# Patient Record
Sex: FEMALE | Race: WHITE | NOT HISPANIC OR LATINO | Employment: OTHER | URBAN - METROPOLITAN AREA
[De-identification: names, ages, dates, MRNs, and addresses within clinical notes are randomized per-mention and may not be internally consistent; named-entity substitution may affect disease eponyms.]

---

## 2021-11-19 ENCOUNTER — HOSPITAL ENCOUNTER (INPATIENT)
Facility: CLINIC | Age: 61
LOS: 1 days | Discharge: OTHER INSTITUTION | End: 2021-11-20
Attending: PHYSICIAN ASSISTANT | Admitting: INTERNAL MEDICINE
Payer: COMMERCIAL

## 2021-11-19 ENCOUNTER — APPOINTMENT (OUTPATIENT)
Dept: GENERAL RADIOLOGY | Facility: CLINIC | Age: 61
End: 2021-11-19
Attending: PHYSICIAN ASSISTANT
Payer: COMMERCIAL

## 2021-11-19 DIAGNOSIS — I21.4 NSTEMI (NON-ST ELEVATED MYOCARDIAL INFARCTION) (H): ICD-10-CM

## 2021-11-19 LAB
ANION GAP SERPL CALCULATED.3IONS-SCNC: 7 MMOL/L (ref 3–14)
BASOPHILS # BLD AUTO: 0.1 10E3/UL (ref 0–0.2)
BASOPHILS NFR BLD AUTO: 0 %
BUN SERPL-MCNC: 20 MG/DL (ref 7–30)
CALCIUM SERPL-MCNC: 9.2 MG/DL (ref 8.5–10.1)
CHLORIDE BLD-SCNC: 103 MMOL/L (ref 94–109)
CO2 SERPL-SCNC: 26 MMOL/L (ref 20–32)
CREAT SERPL-MCNC: 0.74 MG/DL (ref 0.52–1.04)
D DIMER PPP FEU-MCNC: 0.44 UG/ML FEU (ref 0–0.5)
EOSINOPHIL # BLD AUTO: 0.1 10E3/UL (ref 0–0.7)
EOSINOPHIL NFR BLD AUTO: 1 %
ERYTHROCYTE [DISTWIDTH] IN BLOOD BY AUTOMATED COUNT: 13.3 % (ref 10–15)
GFR SERPL CREATININE-BSD FRML MDRD: 88 ML/MIN/1.73M2
GLUCOSE BLD-MCNC: 100 MG/DL (ref 70–99)
HCT VFR BLD AUTO: 44.4 % (ref 35–47)
HGB BLD-MCNC: 14.1 G/DL (ref 11.7–15.7)
HOLD SPECIMEN: NORMAL
IMM GRANULOCYTES # BLD: 0.1 10E3/UL
IMM GRANULOCYTES NFR BLD: 1 %
LYMPHOCYTES # BLD AUTO: 2.3 10E3/UL (ref 0.8–5.3)
LYMPHOCYTES NFR BLD AUTO: 20 %
MCH RBC QN AUTO: 28.9 PG (ref 26.5–33)
MCHC RBC AUTO-ENTMCNC: 31.8 G/DL (ref 31.5–36.5)
MCV RBC AUTO: 91 FL (ref 78–100)
MONOCYTES # BLD AUTO: 0.6 10E3/UL (ref 0–1.3)
MONOCYTES NFR BLD AUTO: 5 %
NEUTROPHILS # BLD AUTO: 8.2 10E3/UL (ref 1.6–8.3)
NEUTROPHILS NFR BLD AUTO: 73 %
NRBC # BLD AUTO: 0 10E3/UL
NRBC BLD AUTO-RTO: 0 /100
PLATELET # BLD AUTO: 293 10E3/UL (ref 150–450)
POTASSIUM BLD-SCNC: 4 MMOL/L (ref 3.4–5.3)
RBC # BLD AUTO: 4.88 10E6/UL (ref 3.8–5.2)
SARS-COV-2 RNA RESP QL NAA+PROBE: NEGATIVE
SODIUM SERPL-SCNC: 136 MMOL/L (ref 133–144)
TROPONIN I SERPL-MCNC: 0.66 UG/L (ref 0–0.04)
WBC # BLD AUTO: 11.3 10E3/UL (ref 4–11)

## 2021-11-19 PROCEDURE — 99207 PR CDG-HISTORY COMP: MEETS EXP. PROB FOCUSED- DOWN CODED LACK OF PFSH: CPT | Performed by: PHYSICIAN ASSISTANT

## 2021-11-19 PROCEDURE — 36415 COLL VENOUS BLD VENIPUNCTURE: CPT | Performed by: PHYSICIAN ASSISTANT

## 2021-11-19 PROCEDURE — 250N000013 HC RX MED GY IP 250 OP 250 PS 637: Performed by: INTERNAL MEDICINE

## 2021-11-19 PROCEDURE — 84484 ASSAY OF TROPONIN QUANT: CPT | Mod: 91 | Performed by: PHYSICIAN ASSISTANT

## 2021-11-19 PROCEDURE — 250N000013 HC RX MED GY IP 250 OP 250 PS 637: Performed by: PHYSICIAN ASSISTANT

## 2021-11-19 PROCEDURE — 84484 ASSAY OF TROPONIN QUANT: CPT | Performed by: PHYSICIAN ASSISTANT

## 2021-11-19 PROCEDURE — 96376 TX/PRO/DX INJ SAME DRUG ADON: CPT

## 2021-11-19 PROCEDURE — 93005 ELECTROCARDIOGRAM TRACING: CPT

## 2021-11-19 PROCEDURE — 71046 X-RAY EXAM CHEST 2 VIEWS: CPT

## 2021-11-19 PROCEDURE — C9803 HOPD COVID-19 SPEC COLLECT: HCPCS

## 2021-11-19 PROCEDURE — 99285 EMERGENCY DEPT VISIT HI MDM: CPT | Mod: 25

## 2021-11-19 PROCEDURE — 120N000001 HC R&B MED SURG/OB

## 2021-11-19 PROCEDURE — 87635 SARS-COV-2 COVID-19 AMP PRB: CPT | Performed by: PHYSICIAN ASSISTANT

## 2021-11-19 PROCEDURE — 99221 1ST HOSP IP/OBS SF/LOW 40: CPT | Mod: AI | Performed by: PHYSICIAN ASSISTANT

## 2021-11-19 PROCEDURE — 93005 ELECTROCARDIOGRAM TRACING: CPT | Mod: 76

## 2021-11-19 PROCEDURE — 250N000011 HC RX IP 250 OP 636: Performed by: PHYSICIAN ASSISTANT

## 2021-11-19 PROCEDURE — 96365 THER/PROPH/DIAG IV INF INIT: CPT

## 2021-11-19 PROCEDURE — 80048 BASIC METABOLIC PNL TOTAL CA: CPT | Performed by: PHYSICIAN ASSISTANT

## 2021-11-19 PROCEDURE — 85025 COMPLETE CBC W/AUTO DIFF WBC: CPT | Performed by: PHYSICIAN ASSISTANT

## 2021-11-19 PROCEDURE — 85379 FIBRIN DEGRADATION QUANT: CPT | Performed by: PHYSICIAN ASSISTANT

## 2021-11-19 PROCEDURE — 96366 THER/PROPH/DIAG IV INF ADDON: CPT

## 2021-11-19 PROCEDURE — 258N000003 HC RX IP 258 OP 636: Performed by: PHYSICIAN ASSISTANT

## 2021-11-19 RX ORDER — LIDOCAINE 40 MG/G
CREAM TOPICAL
Status: DISCONTINUED | OUTPATIENT
Start: 2021-11-19 | End: 2021-11-20 | Stop reason: HOSPADM

## 2021-11-19 RX ORDER — NITROGLYCERIN 0.1MG/HR
1 PATCH, TRANSDERMAL 24 HOURS TRANSDERMAL EVERY EVENING
Status: DISCONTINUED | OUTPATIENT
Start: 2021-11-19 | End: 2021-11-20

## 2021-11-19 RX ORDER — MORPHINE SULFATE 4 MG/ML
1 INJECTION, SOLUTION INTRAMUSCULAR; INTRAVENOUS
Status: DISCONTINUED | OUTPATIENT
Start: 2021-11-19 | End: 2021-11-20 | Stop reason: HOSPADM

## 2021-11-19 RX ORDER — NALOXONE HYDROCHLORIDE 0.4 MG/ML
0.4 INJECTION, SOLUTION INTRAMUSCULAR; INTRAVENOUS; SUBCUTANEOUS
Status: DISCONTINUED | OUTPATIENT
Start: 2021-11-19 | End: 2021-11-20 | Stop reason: HOSPADM

## 2021-11-19 RX ORDER — ACETAMINOPHEN 325 MG/1
650 TABLET ORAL EVERY 6 HOURS PRN
Status: DISCONTINUED | OUTPATIENT
Start: 2021-11-19 | End: 2021-11-20 | Stop reason: HOSPADM

## 2021-11-19 RX ORDER — AMOXICILLIN 250 MG
2 CAPSULE ORAL 2 TIMES DAILY PRN
Status: DISCONTINUED | OUTPATIENT
Start: 2021-11-19 | End: 2021-11-20 | Stop reason: HOSPADM

## 2021-11-19 RX ORDER — ONDANSETRON 4 MG/1
4 TABLET, ORALLY DISINTEGRATING ORAL EVERY 6 HOURS PRN
Status: DISCONTINUED | OUTPATIENT
Start: 2021-11-19 | End: 2021-11-20 | Stop reason: HOSPADM

## 2021-11-19 RX ORDER — CETIRIZINE HYDROCHLORIDE 10 MG/1
10 TABLET ORAL DAILY
COMMUNITY

## 2021-11-19 RX ORDER — IBUPROFEN 200 MG
400 TABLET ORAL EVERY 6 HOURS PRN
Status: ON HOLD | COMMUNITY
End: 2021-11-21

## 2021-11-19 RX ORDER — NALOXONE HYDROCHLORIDE 0.4 MG/ML
0.2 INJECTION, SOLUTION INTRAMUSCULAR; INTRAVENOUS; SUBCUTANEOUS
Status: DISCONTINUED | OUTPATIENT
Start: 2021-11-19 | End: 2021-11-20 | Stop reason: HOSPADM

## 2021-11-19 RX ORDER — ATORVASTATIN CALCIUM 40 MG/1
40 TABLET, FILM COATED ORAL EVERY EVENING
Status: DISCONTINUED | OUTPATIENT
Start: 2021-11-19 | End: 2021-11-20 | Stop reason: HOSPADM

## 2021-11-19 RX ORDER — HEPARIN SODIUM 10000 [USP'U]/100ML
0-5000 INJECTION, SOLUTION INTRAVENOUS CONTINUOUS
Status: DISCONTINUED | OUTPATIENT
Start: 2021-11-19 | End: 2021-11-20 | Stop reason: HOSPADM

## 2021-11-19 RX ORDER — DULOXETIN HYDROCHLORIDE 20 MG/1
20 CAPSULE, DELAYED RELEASE ORAL 2 TIMES DAILY
COMMUNITY
End: 2022-10-31

## 2021-11-19 RX ORDER — NITROGLYCERIN 0.4 MG/1
0.4 TABLET SUBLINGUAL EVERY 5 MIN PRN
Status: DISCONTINUED | OUTPATIENT
Start: 2021-11-19 | End: 2021-11-20 | Stop reason: HOSPADM

## 2021-11-19 RX ORDER — ASPIRIN 81 MG/1
81 TABLET, CHEWABLE ORAL DAILY
Status: DISCONTINUED | OUTPATIENT
Start: 2021-11-20 | End: 2021-11-20 | Stop reason: HOSPADM

## 2021-11-19 RX ORDER — FAMOTIDINE 20 MG/1
20 TABLET, FILM COATED ORAL 2 TIMES DAILY
Status: DISCONTINUED | OUTPATIENT
Start: 2021-11-19 | End: 2021-11-20 | Stop reason: HOSPADM

## 2021-11-19 RX ORDER — AMOXICILLIN 250 MG
1 CAPSULE ORAL 2 TIMES DAILY PRN
Status: DISCONTINUED | OUTPATIENT
Start: 2021-11-19 | End: 2021-11-20 | Stop reason: HOSPADM

## 2021-11-19 RX ORDER — DULOXETIN HYDROCHLORIDE 20 MG/1
20 CAPSULE, DELAYED RELEASE ORAL 2 TIMES DAILY
Status: DISCONTINUED | OUTPATIENT
Start: 2021-11-19 | End: 2021-11-20 | Stop reason: HOSPADM

## 2021-11-19 RX ORDER — ASPIRIN 81 MG/1
324 TABLET, CHEWABLE ORAL ONCE
Status: COMPLETED | OUTPATIENT
Start: 2021-11-19 | End: 2021-11-19

## 2021-11-19 RX ORDER — ONDANSETRON 2 MG/ML
4 INJECTION INTRAMUSCULAR; INTRAVENOUS EVERY 6 HOURS PRN
Status: DISCONTINUED | OUTPATIENT
Start: 2021-11-19 | End: 2021-11-20 | Stop reason: HOSPADM

## 2021-11-19 RX ORDER — HEPARIN SODIUM 10000 [USP'U]/100ML
0-5000 INJECTION, SOLUTION INTRAVENOUS CONTINUOUS
Status: DISCONTINUED | OUTPATIENT
Start: 2021-11-19 | End: 2021-11-19

## 2021-11-19 RX ORDER — ACETAMINOPHEN 650 MG/1
650 SUPPOSITORY RECTAL EVERY 6 HOURS PRN
Status: DISCONTINUED | OUTPATIENT
Start: 2021-11-19 | End: 2021-11-20 | Stop reason: HOSPADM

## 2021-11-19 RX ORDER — NITROGLYCERIN 0.4 MG/1
0.4 TABLET SUBLINGUAL EVERY 5 MIN PRN
Status: COMPLETED | OUTPATIENT
Start: 2021-11-19 | End: 2021-11-19

## 2021-11-19 RX ORDER — NITROGLYCERIN 0.1MG/HR
1 PATCH, TRANSDERMAL 24 HOURS TRANSDERMAL DAILY
Status: DISCONTINUED | OUTPATIENT
Start: 2021-11-20 | End: 2021-11-19

## 2021-11-19 RX ORDER — LISINOPRIL 5 MG/1
5 TABLET ORAL DAILY
Status: DISCONTINUED | OUTPATIENT
Start: 2021-11-20 | End: 2021-11-20

## 2021-11-19 RX ORDER — LANOLIN ALCOHOL/MO/W.PET/CERES
1000 CREAM (GRAM) TOPICAL DAILY
COMMUNITY

## 2021-11-19 RX ADMIN — HEPARIN SODIUM 1100 UNITS/HR: 1000 INJECTION INTRAVENOUS; SUBCUTANEOUS at 19:26

## 2021-11-19 RX ADMIN — NITROGLYCERIN 0.4 MG: 0.4 TABLET SUBLINGUAL at 18:52

## 2021-11-19 RX ADMIN — NITROGLYCERIN 0.4 MG: 0.4 TABLET SUBLINGUAL at 18:42

## 2021-11-19 RX ADMIN — NITROGLYCERIN 0.4 MG: 0.4 TABLET SUBLINGUAL at 19:15

## 2021-11-19 RX ADMIN — DULOXETINE HYDROCHLORIDE 20 MG: 20 CAPSULE, DELAYED RELEASE ORAL at 22:51

## 2021-11-19 RX ADMIN — NITROGLYCERIN 1 PATCH: 0.1 PATCH TRANSDERMAL at 23:30

## 2021-11-19 RX ADMIN — FAMOTIDINE 20 MG: 20 TABLET ORAL at 22:51

## 2021-11-19 RX ADMIN — METOPROLOL TARTRATE 12.5 MG: 25 TABLET, FILM COATED ORAL at 22:51

## 2021-11-19 RX ADMIN — ASPIRIN 81 MG CHEWABLE TABLET 324 MG: 81 TABLET CHEWABLE at 18:48

## 2021-11-19 RX ADMIN — ATORVASTATIN CALCIUM 40 MG: 40 TABLET, FILM COATED ORAL at 22:51

## 2021-11-19 RX ADMIN — Medication 1 MG: at 23:06

## 2021-11-19 ASSESSMENT — MIFFLIN-ST. JEOR: SCORE: 1518.88

## 2021-11-19 ASSESSMENT — ACTIVITIES OF DAILY LIVING (ADL)
ADLS_ACUITY_SCORE: 12

## 2021-11-19 ASSESSMENT — ENCOUNTER SYMPTOMS
COUGH: 1
NUMBNESS: 1

## 2021-11-19 NOTE — ED TRIAGE NOTES
Left sided chest pain radiates to left arm and back. Patient states pain started at 1330 with nausea and dental pain. ABC intact alert and no distress.

## 2021-11-19 NOTE — ED PROVIDER NOTES
History     Chief Complaint:  Chest Pain and Arm Pain       HPI   Terese Gonzalez is a 61 year old female with a history of fibromyalgia who presents to the ED for evaluation of chest pain.  The patient reports onset of left-sided chest pain that began around 1330 today.  She notes that it has been constant since that time, radiating down her left arm and around her left lateral back.  She describes it feeling is similar to indigestion.  She said it has made worse when taking a deep breath.  She rates her pain as 5-7/10. she notes that she took 2 ibuprofen without any change in her symptoms.  She reports associated left-sided paresthesias as well as a mild cough. She states she has been experiencing paresthesias for quite some time and is working with [facility] with respect to this. She denies any fevers, chills, URI symptoms, dyspnea, leg pain or swelling, rash.    ROS:  Review of Systems   Respiratory: Positive for cough.    Cardiovascular: Positive for chest pain.   Neurological: Positive for numbness.   All other systems reviewed and are negative.     Allergies:  No Known Allergies     Medications:    Cymbalta    Past Medical History:    Diverticulitis  Gastric ulcer  Superficial clot     Past Surgical History:    C section  Cholecystectomy     Social History:  Here with .    PCP: Clinic, [facility]     Physical Exam     Patient Vitals for the past 24 hrs:   BP Temp Temp src Pulse Resp SpO2 Weight   11/19/21 1920 130/77 -- -- 70 14 97 % --   11/19/21 1900 (!) 134/98 -- -- 73 17 98 % --   11/19/21 1858 (!) 125/92 -- -- 79 16 97 % --   11/19/21 1850 130/89 -- -- 75 12 100 % --   11/19/21 1845 -- -- -- -- -- -- 90.9 kg (200 lb 6.4 oz)   11/19/21 1830 (!) 144/105 -- -- 80 18 100 % --   11/19/21 1730 134/86 -- -- 70 12 100 % --   11/19/21 1630 (!) 161/91 -- -- 92 14 100 % --   11/19/21 1615 (!) 164/101 97.5  F (36.4  C) Temporal 75 20 99 % --        Physical Exam  Constitutional: Pleasant. Cooperative.    Eyes: Pupils equally round and reactive  HENT: Head is normal in appearance. Oropharynx is normal with moist mucus membranes.  Cardiovascular: Regular rate and rhythm and without murmurs.  Respiratory: Normal respiratory effort, lungs are clear bilaterally.  GI: Abdomen is soft, non-tender, non-distended. No guarding, rebound, or rigidity.  Musculoskeletal: No asymmetry of the lower extremities, no tenderness to palpation.   Skin: Normal, without rash.  Neurologic: Cranial nerves grossly intact, normal cognition, no focal deficits. Alert and oriented x 3.   Psychiatric: Normal affect.  Nursing notes and vital signs reviewed.      Emergency Department Course   ECG:  ECG @ 1615  Vent Rate: 69 BPM   FL Interval: 144 ms   QRS duration: 82 ms   QT/QTc: 386/413 ms   P-R-T Axis: * 8 64  Findings: Normal sinus rhythm  Nonspecific T wave abnormality   III + aVL + aVF  Compared to previous 10/26/2014    ECG @ 1827  Vent Rate: 79 BPM   FL Interval: 140 ms   QRS duration: 82 ms   QT/QTc: 376/431 ms   P-R-T Axis: 28 -15 36  Findings: Normal sinus rhythm  Normal ECG  Improved from previous 11/19/21 @1615    Imaging:  XR Chest 2 Views   Final Result   IMPRESSION: Small esophageal hiatal hernia. Previous cholecystectomy. Mild scoliosis. No pulmonary infiltrates. Normal heart size.         Report per radiology    Laboratory:  Labs Ordered and Resulted from Time of ED Arrival to Time of ED Departure   TROPONIN I - Abnormal       Result Value    Troponin I 0.664 (*)    BASIC METABOLIC PANEL - Abnormal    Sodium 136      Potassium 4.0      Chloride 103      Carbon Dioxide (CO2) 26      Anion Gap 7      Urea Nitrogen 20      Creatinine 0.74      Calcium 9.2      Glucose 100 (*)     GFR Estimate 88     CBC WITH PLATELETS AND DIFFERENTIAL - Abnormal    WBC Count 11.3 (*)     RBC Count 4.88      Hemoglobin 14.1      Hematocrit 44.4      MCV 91      MCH 28.9      MCHC 31.8      RDW 13.3      Platelet Count 293      % Neutrophils 73      %  Lymphocytes 20      % Monocytes 5      % Eosinophils 1      % Basophils 0      % Immature Granulocytes 1      NRBCs per 100 WBC 0      Absolute Neutrophils 8.2      Absolute Lymphocytes 2.3      Absolute Monocytes 0.6      Absolute Eosinophils 0.1      Absolute Basophils 0.1      Absolute Immature Granulocytes 0.1 (*)     Absolute NRBCs 0.0     D DIMER QUANTITATIVE - Normal    D-Dimer Quantitative 0.44     COVID-19 VIRUS (CORONAVIRUS) BY PCR        Emergency Department Course:  Reviewed:  I reviewed nursing notes, vitals and past medical history    Assessments:   I obtained history and examined the patient as noted above.   1824 I rechecked the patient and explained findings.   1912 I rechecked the patient and explained findings.    Consults:   Spoke with hospitalist.    Interventions:  Medications   heparin infusion 25,000 units in D5W 250 mL ANTICOAGULANT (1,100 Units/hr Intravenous New Bag 11/19/21 1926)   nitroGLYcerin (NITROSTAT) sublingual tablet 0.4 mg (0.4 mg Sublingual Given 11/19/21 1915)   aspirin (ASA) chewable tablet 324 mg (324 mg Oral Given 11/19/21 1848)   heparin loading dose for LOW INTENSITY TREATMENT * Give BEFORE starting heparin infusion (5,450 Units Intravenous Given 11/19/21 1926)        Disposition:  The patient was admitted to the hospital.    Impression & Plan      Medical Decision Making:  Terese Gonzalez is a 61 year old female who presents to the ED for evaluation of chest pain.  See HPI as above for additional details.  Vitals and physical exam as above.  Differential is broad include ACS, dissection, PE, pneumothorax, GERD, MSK, pneumonia, pericarditis, among others.  Work-up obtained as above notable for elevated troponin at 0.664.  Remainder of work-up as above.  Patient initiated on heparin and provided aspirin here in the ED.  Discussed with patient plan for admission for continued monitoring and further work-up.  Patient in agreement for admission.  Discussed case with  hospitalist, agreed to admit the patient.  All questions answered.  Patient admitted in stable condition.    Diagnosis:    ICD-10-CM    1. NSTEMI (non-ST elevated myocardial infarction) (H)  I21.4         I, Kristin Oscar, am serving as a scribe at 6:24 PM on 11/19/2021 to document services personally performed by Caleb Dow PA-C, based on my observations and the provider's statements to me.    11/19/2021   Caleb Dow PA-C     This record was created at least in part using electronic voice recognition software, so please excuse any typographical errors.       Caleb Dow PA-C  11/19/21 2002

## 2021-11-20 ENCOUNTER — APPOINTMENT (OUTPATIENT)
Dept: CARDIOLOGY | Facility: CLINIC | Age: 61
End: 2021-11-20
Attending: PHYSICIAN ASSISTANT
Payer: COMMERCIAL

## 2021-11-20 ENCOUNTER — HOSPITAL ENCOUNTER (INPATIENT)
Facility: CLINIC | Age: 61
LOS: 2 days | Discharge: HOME | End: 2021-11-22
Attending: INTERNAL MEDICINE | Admitting: INTERNAL MEDICINE
Payer: COMMERCIAL

## 2021-11-20 VITALS
BODY MASS INDEX: 30.25 KG/M2 | HEIGHT: 68 IN | DIASTOLIC BLOOD PRESSURE: 66 MMHG | HEART RATE: 62 BPM | WEIGHT: 199.6 LBS | OXYGEN SATURATION: 95 % | RESPIRATION RATE: 16 BRPM | SYSTOLIC BLOOD PRESSURE: 103 MMHG | TEMPERATURE: 97.9 F

## 2021-11-20 DIAGNOSIS — I21.4 NSTEMI (NON-ST ELEVATED MYOCARDIAL INFARCTION) (H): Primary | ICD-10-CM

## 2021-11-20 LAB
ANION GAP SERPL CALCULATED.3IONS-SCNC: 5 MMOL/L (ref 3–14)
BASOPHILS # BLD AUTO: 0.1 10E3/UL (ref 0–0.2)
BASOPHILS NFR BLD AUTO: 1 %
BUN SERPL-MCNC: 14 MG/DL (ref 7–30)
CALCIUM SERPL-MCNC: 8.7 MG/DL (ref 8.5–10.1)
CHLORIDE BLD-SCNC: 107 MMOL/L (ref 94–109)
CHOLEST SERPL-MCNC: 216 MG/DL
CO2 SERPL-SCNC: 25 MMOL/L (ref 20–32)
CREAT SERPL-MCNC: 0.76 MG/DL (ref 0.52–1.04)
EOSINOPHIL # BLD AUTO: 0.1 10E3/UL (ref 0–0.7)
EOSINOPHIL NFR BLD AUTO: 2 %
ERYTHROCYTE [DISTWIDTH] IN BLOOD BY AUTOMATED COUNT: 13.2 % (ref 10–15)
ERYTHROCYTE [DISTWIDTH] IN BLOOD BY AUTOMATED COUNT: 13.4 % (ref 10–15)
GFR SERPL CREATININE-BSD FRML MDRD: 85 ML/MIN/1.73M2
GLUCOSE BLD-MCNC: 108 MG/DL (ref 70–99)
HCT VFR BLD AUTO: 40 % (ref 35–47)
HCT VFR BLD AUTO: 40.2 % (ref 35–47)
HDLC SERPL-MCNC: 49 MG/DL
HGB BLD-MCNC: 12.5 G/DL (ref 11.7–15.7)
HGB BLD-MCNC: 12.8 G/DL (ref 11.7–15.7)
IMM GRANULOCYTES # BLD: 0 10E3/UL
IMM GRANULOCYTES NFR BLD: 1 %
LDLC SERPL CALC-MCNC: 138 MG/DL
LVEF ECHO: NORMAL
LYMPHOCYTES # BLD AUTO: 2.8 10E3/UL (ref 0.8–5.3)
LYMPHOCYTES NFR BLD AUTO: 33 %
MCH RBC QN AUTO: 28.5 PG (ref 26.5–33)
MCH RBC QN AUTO: 28.8 PG (ref 26.5–33)
MCHC RBC AUTO-ENTMCNC: 31.1 G/DL (ref 31.5–36.5)
MCHC RBC AUTO-ENTMCNC: 32 G/DL (ref 31.5–36.5)
MCV RBC AUTO: 90 FL (ref 78–100)
MCV RBC AUTO: 92 FL (ref 78–100)
MONOCYTES # BLD AUTO: 0.7 10E3/UL (ref 0–1.3)
MONOCYTES NFR BLD AUTO: 8 %
NEUTROPHILS # BLD AUTO: 5 10E3/UL (ref 1.6–8.3)
NEUTROPHILS NFR BLD AUTO: 55 %
NONHDLC SERPL-MCNC: 167 MG/DL
NRBC # BLD AUTO: 0 10E3/UL
NRBC BLD AUTO-RTO: 0 /100
PLATELET # BLD AUTO: 266 10E3/UL (ref 150–450)
PLATELET # BLD AUTO: 288 10E3/UL (ref 150–450)
POTASSIUM BLD-SCNC: 4.2 MMOL/L (ref 3.4–5.3)
RBC # BLD AUTO: 4.38 10E6/UL (ref 3.8–5.2)
RBC # BLD AUTO: 4.45 10E6/UL (ref 3.8–5.2)
SODIUM SERPL-SCNC: 137 MMOL/L (ref 133–144)
TRIGL SERPL-MCNC: 147 MG/DL
TROPONIN I SERPL-MCNC: 16.84 UG/L (ref 0–0.04)
TROPONIN I SERPL-MCNC: 18.8 UG/L (ref 0–0.04)
TROPONIN I SERPL-MCNC: 20.39 UG/L (ref 0–0.04)
UFH PPP CHRO-ACNC: 0.24 IU/ML
UFH PPP CHRO-ACNC: 0.32 IU/ML
UFH PPP CHRO-ACNC: 0.47 IU/ML
WBC # BLD AUTO: 8.7 10E3/UL (ref 4–11)
WBC # BLD AUTO: 9.1 10E3/UL (ref 4–11)

## 2021-11-20 PROCEDURE — 93005 ELECTROCARDIOGRAM TRACING: CPT

## 2021-11-20 PROCEDURE — 214N000001 HC R&B CCU UMMC

## 2021-11-20 PROCEDURE — 4A023N7 MEASUREMENT OF CARDIAC SAMPLING AND PRESSURE, LEFT HEART, PERCUTANEOUS APPROACH: ICD-10-PCS | Performed by: INTERNAL MEDICINE

## 2021-11-20 PROCEDURE — 36415 COLL VENOUS BLD VENIPUNCTURE: CPT | Performed by: PHYSICIAN ASSISTANT

## 2021-11-20 PROCEDURE — 85027 COMPLETE CBC AUTOMATED: CPT | Performed by: STUDENT IN AN ORGANIZED HEALTH CARE EDUCATION/TRAINING PROGRAM

## 2021-11-20 PROCEDURE — 99221 1ST HOSP IP/OBS SF/LOW 40: CPT | Performed by: INTERNAL MEDICINE

## 2021-11-20 PROCEDURE — 85520 HEPARIN ASSAY: CPT | Performed by: STUDENT IN AN ORGANIZED HEALTH CARE EDUCATION/TRAINING PROGRAM

## 2021-11-20 PROCEDURE — 250N000013 HC RX MED GY IP 250 OP 250 PS 637: Performed by: PHYSICIAN ASSISTANT

## 2021-11-20 PROCEDURE — 80061 LIPID PANEL: CPT | Performed by: PHYSICIAN ASSISTANT

## 2021-11-20 PROCEDURE — 93306 TTE W/DOPPLER COMPLETE: CPT

## 2021-11-20 PROCEDURE — 85520 HEPARIN ASSAY: CPT | Performed by: PHYSICIAN ASSISTANT

## 2021-11-20 PROCEDURE — B2111ZZ FLUOROSCOPY OF MULTIPLE CORONARY ARTERIES USING LOW OSMOLAR CONTRAST: ICD-10-PCS | Performed by: INTERNAL MEDICINE

## 2021-11-20 PROCEDURE — 36415 COLL VENOUS BLD VENIPUNCTURE: CPT | Performed by: STUDENT IN AN ORGANIZED HEALTH CARE EDUCATION/TRAINING PROGRAM

## 2021-11-20 PROCEDURE — 93306 TTE W/DOPPLER COMPLETE: CPT | Mod: 26 | Performed by: INTERNAL MEDICINE

## 2021-11-20 PROCEDURE — 84484 ASSAY OF TROPONIN QUANT: CPT | Performed by: PHYSICIAN ASSISTANT

## 2021-11-20 PROCEDURE — 250N000013 HC RX MED GY IP 250 OP 250 PS 637: Performed by: STUDENT IN AN ORGANIZED HEALTH CARE EDUCATION/TRAINING PROGRAM

## 2021-11-20 PROCEDURE — 250N000011 HC RX IP 250 OP 636: Performed by: STUDENT IN AN ORGANIZED HEALTH CARE EDUCATION/TRAINING PROGRAM

## 2021-11-20 PROCEDURE — 80048 BASIC METABOLIC PNL TOTAL CA: CPT | Performed by: PHYSICIAN ASSISTANT

## 2021-11-20 PROCEDURE — 93010 ELECTROCARDIOGRAM REPORT: CPT | Performed by: INTERNAL MEDICINE

## 2021-11-20 PROCEDURE — 85025 COMPLETE CBC W/AUTO DIFF WBC: CPT | Performed by: PHYSICIAN ASSISTANT

## 2021-11-20 PROCEDURE — 250N000011 HC RX IP 250 OP 636: Performed by: PHYSICIAN ASSISTANT

## 2021-11-20 PROCEDURE — 258N000003 HC RX IP 258 OP 636: Performed by: PHYSICIAN ASSISTANT

## 2021-11-20 PROCEDURE — 99239 HOSP IP/OBS DSCHRG MGMT >30: CPT | Performed by: STUDENT IN AN ORGANIZED HEALTH CARE EDUCATION/TRAINING PROGRAM

## 2021-11-20 RX ORDER — HEPARIN SODIUM 10000 [USP'U]/100ML
0-5000 INJECTION, SOLUTION INTRAVENOUS CONTINUOUS
Status: DISCONTINUED | OUTPATIENT
Start: 2021-11-20 | End: 2021-11-21

## 2021-11-20 RX ORDER — CETIRIZINE HYDROCHLORIDE 10 MG/1
10 TABLET ORAL DAILY
Status: DISCONTINUED | OUTPATIENT
Start: 2021-11-21 | End: 2021-11-22 | Stop reason: HOSPADM

## 2021-11-20 RX ORDER — ATORVASTATIN CALCIUM 40 MG/1
40 TABLET, FILM COATED ORAL EVERY EVENING
Status: DISCONTINUED | OUTPATIENT
Start: 2021-11-20 | End: 2021-11-22 | Stop reason: HOSPADM

## 2021-11-20 RX ORDER — DULOXETIN HYDROCHLORIDE 20 MG/1
20 CAPSULE, DELAYED RELEASE ORAL 2 TIMES DAILY
Status: DISCONTINUED | OUTPATIENT
Start: 2021-11-20 | End: 2021-11-22 | Stop reason: HOSPADM

## 2021-11-20 RX ORDER — LANOLIN ALCOHOL/MO/W.PET/CERES
1000 CREAM (GRAM) TOPICAL DAILY
Status: DISCONTINUED | OUTPATIENT
Start: 2021-11-21 | End: 2021-11-22 | Stop reason: HOSPADM

## 2021-11-20 RX ORDER — LIDOCAINE 40 MG/G
CREAM TOPICAL
Status: DISCONTINUED | OUTPATIENT
Start: 2021-11-20 | End: 2021-11-22 | Stop reason: HOSPADM

## 2021-11-20 RX ADMIN — HEPARIN SODIUM 1250 UNITS/HR: 1000 INJECTION INTRAVENOUS; SUBCUTANEOUS at 12:01

## 2021-11-20 RX ADMIN — MORPHINE SULFATE 1 MG: 4 INJECTION INTRAVENOUS at 03:52

## 2021-11-20 RX ADMIN — ASPIRIN 81 MG CHEWABLE TABLET 81 MG: 81 TABLET CHEWABLE at 09:12

## 2021-11-20 RX ADMIN — FAMOTIDINE 20 MG: 20 TABLET ORAL at 09:12

## 2021-11-20 RX ADMIN — ATORVASTATIN CALCIUM 40 MG: 40 TABLET, FILM COATED ORAL at 22:11

## 2021-11-20 RX ADMIN — LISINOPRIL 5 MG: 5 TABLET ORAL at 09:13

## 2021-11-20 RX ADMIN — DULOXETINE HYDROCHLORIDE 20 MG: 20 CAPSULE, DELAYED RELEASE ORAL at 09:12

## 2021-11-20 RX ADMIN — DULOXETINE HYDROCHLORIDE 20 MG: 20 CAPSULE, DELAYED RELEASE ORAL at 22:11

## 2021-11-20 RX ADMIN — MORPHINE SULFATE 1 MG: 4 INJECTION INTRAVENOUS at 00:57

## 2021-11-20 RX ADMIN — MORPHINE SULFATE 1 MG: 4 INJECTION INTRAVENOUS at 09:08

## 2021-11-20 RX ADMIN — NITROGLYCERIN 10 MCG/MIN: 20 INJECTION INTRAVENOUS at 12:53

## 2021-11-20 RX ADMIN — METOPROLOL TARTRATE 12.5 MG: 25 TABLET, FILM COATED ORAL at 09:13

## 2021-11-20 ASSESSMENT — ACTIVITIES OF DAILY LIVING (ADL)
ADLS_ACUITY_SCORE: 4
ADLS_ACUITY_SCORE: 6
ADLS_ACUITY_SCORE: 4
ADLS_ACUITY_SCORE: 6
ADLS_ACUITY_SCORE: 4

## 2021-11-20 ASSESSMENT — MIFFLIN-ST. JEOR: SCORE: 1496.2

## 2021-11-20 NOTE — DISCHARGE SUMMARY
[facility]  Discharge Summary  Name: Terese Gonzalez    MRN: [id number]  YOB: 1960    Age: 61 year old  Date of Discharge:  11/20/2021  Date of Admission: 11/19/2021  Primary Care Provider: System, Provider Not In  Discharge Physician:  Nav Latif DO  Discharging Service:  Hospitalist      Hospital Course  Terese Gonzalez is a 61 year old relatively healthy female except for history of fibromyalgia who presents to the ED on 11/19/21 for evaluation of left sided chest pain.      ED workup reveals: intermittent hypertensive otherwise VSS, BMP unremarkable, initial troponin of 0.664, glucose of 100, WBC of 11.3 otherwise CBC WNL, D-dimer of 0.44, UA shows rate of 69 bpm in NSR with nonspecific T wave abnormality in III, AVL, AVF at 16:15 with repeat showing NSR, and chest x-ray shows small esophageal hiatal hernia, no pulmonary infiltrates, and normal heart size.    The patient was initiated on a heparin drip and started on a nitroglycerin patch in the emergency department for suspected NSTEMI.  Troponin ultimately trended up to 20.0 in the setting of ongoing chest pain.  The patient was therefore started on a nitroglycerin drip.  The nitroglycerin drip improved her chest pain quite significantly.      Given need for nitroglycerin drip, the patient will be transferred to [facility] for more urgent catheterization versus ICU level cares (currently on intermediate cares at [facility] given lack of ICU beds).    Discharge Diagnoses:  NSTEMI:   Acute onset of left sided chest pain described as indigestion with radiation into left arm and lower left shoulder blade around 13:30 on the day of admission with associated nausea and pain in right lower jaw/tooth. Pain seemed worse with deep inhalation. No prior episodes similar to this. No h/o CAD, HTN, HLD, DM, or tobacco abuse. Family history of CAD in father and grandfather. EKG without overt ischemic  changes. Initial troponin elevated at 0.664 which then subsequently trended up to 16 and then 20.  TTE was completed which showed normal ejection fraction but did show evidence of distal posterolateral hypokinesis.  Given ongoing chest tightness/pressure, the patient was initiated on a nitroglycerin drip.  Plan is for transfer to [facility] for more urgent catheterization (if appropriate) versus ICU level cares as she is currently intermediate care (no ICU bed) at [facility].  - monitor on telemetry  - daily ASA  - I am holding the previously started metoprolol and lisinopril so that we can further uptitrate her nitroglycerin drip if needed  - Atorvastatin 40 mg daily   - Stop checking troponin levels it is now downtrending  - continue IV Heparin gtt (started in ED)  - Nitroglycerin drip titrated as needed to no chest pain  - PRN IV morphine for ongoing chest pain with patch in place   - cardiology consult for recommendations      Fibromyalgia: resume PTA Cymbalta     Discharge Disposition:  Transferred to [facility].  The patient was signed out to the accepting attending at [facility].    Allergies:  No Known Allergies     Discharge Medications:        Review of your medicines      UNREVIEWED medicines. Ask your doctor about these medicines      Dose / Directions   acetaminophen 650 MG CR tablet  Commonly known as: Tylenol 8 Hour  Used for: Concussion      Dose: 650 mg  Take 1 tablet by mouth every 8 hours as needed for pain.  Quantity: 100 tablet  Refills: 11     cetirizine 10 MG tablet  Commonly known as: zyrTEC      Dose: 10 mg  Take 10 mg by mouth daily  Refills: 0     cyanocobalamin 1000 MCG tablet  Commonly known as: VITAMIN B-12      Dose: 1,000 mcg  Take 1,000 mcg by mouth daily  Refills: 0     DULoxetine 20 MG capsule  Commonly known as: CYMBALTA      Dose: 20 mg  Take 20 mg by mouth 2 times daily  Refills: 0     ibuprofen 200 MG tablet  Commonly known as:  "ADVIL/MOTRIN  Ask about: Which instructions should I use?      Dose: 400 mg  Take 400 mg by mouth every 6 hours as needed for mild pain  Refills: 0            Condition on Discharge:  Discharge condition: Stable       Code status on discharge: Full Code     History of Illness:  See detailed admission note for full details.    Physical Exam:  Vital signs:  Temp: 97.9  F (36.6  C) Temp src: Oral BP: 95/63 Pulse: 63   Resp: 16 SpO2: 95 % O2 Device: None (Room air)   Height: 172.7 cm (5' 8\") Weight: 90.5 kg (199 lb 9.6 oz) (East bond scale)  Estimated body mass index is 30.35 kg/m  as calculated from the following:    Height as of this encounter: 1.727 m (5' 8\").    Weight as of this encounter: 90.5 kg (199 lb 9.6 oz).    Wt Readings from Last 1 Encounters:   11/19/21 90.5 kg (199 lb 9.6 oz)     General: Alert, awake, no acute distress.  HEENT: NC/AT, eyes anicteric, external occular movements intact, face symmetric.  Dentition WNL, MM moist.  Cardiac: RRR, S1, S2.  No murmurs appreciated.  Pulmonary: Normal chest rise, normal work of breathing.  Lungs CTA BL  Abdomen: soft, non-tender, non-distended.  Bowel Sounds Present.  No guarding.  Extremities: no deformities.  Warm, well perfused.  Skin: no rashes or lesions noted.  Warm and Dry.  Neuro: No focal deficits noted.  Speech clear.  Coordination and strength grossly normal.  Psych: Appropriate affect.    Procedures other than Imaging:  None     Imaging:  Recent Results (from the past 48 hour(s))   XR Chest 2 Views    Narrative    EXAM: XR CHEST 2 VW  LOCATION: [facility]  DATE/TIME: 11/19/2021 5:35 PM    INDICATION: Chest pain.  COMPARISON: None.      Impression    IMPRESSION: Small esophageal hiatal hernia. Previous cholecystectomy. Mild scoliosis. No pulmonary infiltrates. Normal heart size.   Echocardiogram Complete   Result Value    LVEF  55%    Narrative    [id number]  [id number]  [id number]  [id number]^LAURA^BERNARDO^MARSHA     [facility] " [facility]  Echocardiography Laboratory  [address] [place] [address]     Name: LONG BAH  MRN: [id number]  : 1960  Study Date: 2021 07:20 AM  Age: 61 yrs  Gender: Female  Patient Location: [facility]  Reason For Study: Chest Pain, Chest Tightness, Chest Pressure  Ordering Physician: BERNARDO SANCHEZ  Performed By: Savannah Tejeda     BSA: 2.0 m2  Height: 68 in  Weight: 200 lb  HR: 60  BP: 111/80 mmHg  ______________________________________________________________________________  Procedure  Complete Portable Echo Adult.  ______________________________________________________________________________  Interpretation Summary     The visual ejection fraction is estimated at 55%.  distal posterolateral hypokinesis  The right ventricle is normal in structure, function and size.  There is trace to mild tricuspid regurgitation.  The patient exhibited occasional PVCs.  There is no comparison study available.  ______________________________________________________________________________  Left Ventricle  The left ventricle is normal in structure, function and size. There is normal  left ventricular wall thickness. The visual ejection fraction is estimated at  55%. Left ventricular diastolic function is normal. distal posterolateral  hypokinesis.     Right Ventricle  The right ventricle is normal in structure, function and size.     Atria  Normal left atrial size. Right atrial size is normal. There is no color  Doppler evidence of an atrial shunt.     Mitral Valve  The mitral valve leaflets are mildly thickened. There is no mitral  regurgitation noted.     Tricuspid Valve  The tricuspid valve is normal in structure and function. There is trace to  mild tricuspid regurgitation. The right ventricular systolic pressure is  approximated at 18.9 mmHg plus the right atrial pressure.     Aortic Valve  The aortic valve is normal in structure and function.     Pulmonic Valve  The pulmonic valve is not  well visualized.     Vessels  Normal size aorta.     Pericardium  There is no pericardial effusion.     Rhythm  The patient exhibited occasional PVCs.  ______________________________________________________________________________  MMode/2D Measurements & Calculations     IVSd: 1.0 cm  LVIDd: 5.4 cm  LVIDs: 3.3 cm  LVPWd: 0.67 cm  FS: 39.4 %  LV mass(C)d: 164.9 grams  LV mass(C)dI: 80.7 grams/m2  Ao root diam: 3.1 cm  LA dimension: 3.8 cm  asc Aorta Diam: 3.4 cm  LA/Ao: 1.2  LVOT diam: 2.0 cm  LVOT area: 3.1 cm2  LA Volume (BP): 49.2 ml  LA Volume Index (BP): 24.1 ml/m2  RWT: 0.25     Doppler Measurements & Calculations  MV E max tommy: 65.6 cm/sec  MV A max tommy: 64.8 cm/sec  MV E/A: 1.0  MV dec time: 0.16 sec  LV V1 max P.3 mmHg  LV V1 max: 115.0 cm/sec  LV V1 VTI: 25.1 cm  SV(LVOT): 78.9 ml  SI(LVOT): 38.6 ml/m2  PA acc time: 0.13 sec  TR max tommy: 215.3 cm/sec  TR max P.9 mmHg  E/E' av.0  Lateral E/e': 8.9  Medial E/e': 7.2     ______________________________________________________________________________  Report approved by: Rach Leong 2021 10:49 AM                Consultations:  Consultation during this admission received from cardiology.       Recent Lab Results:  Recent Labs   Lab 21  0617 21  1634   WBC 8.7 11.3*   HGB 12.5 14.1   HCT 40.2 44.4   MCV 92 91    293          Lab Results   Component Value Date     2021     2021     10/26/2014     08/10/2012    Lab Results   Component Value Date    CHLORIDE 107 2021    CHLORIDE 103 2021    CHLORIDE 108 10/26/2014    CHLORIDE 107 08/10/2012    Lab Results   Component Value Date    BUN 14 2021    BUN 20 2021    BUN 18 10/26/2014    BUN 21 08/10/2012      Lab Results   Component Value Date    POTASSIUM 4.2 2021    POTASSIUM 4.0 2021    POTASSIUM 4.0 10/26/2014    POTASSIUM 3.9 08/10/2012    Lab Results   Component Value Date    CO2 25 2021     CO2 26 11/19/2021    CO2 26 10/26/2014    CO2 22 08/10/2012    Lab Results   Component Value Date    CR 0.76 11/20/2021    CR 0.74 11/19/2021    CR 0.88 10/26/2014    CR 0.99 08/10/2012             Pending Results:    Unresulted Labs Ordered in the Past 30 Days of this Admission     No orders found for last 31 day(s).           Discharge Instructions and Follow-Up:   No discharge procedures on file.    Total time spent in face to face contact with the patient and coordinating discharge was:  >30 Minutes.      Nav Latif DO

## 2021-11-20 NOTE — CONSULTS
[facility]    Cardiology Consultation     Date of Admission:  11/19/2021    Assessment & Plan      This is a pleasant 61 year old female with PMH significant for fibromyalgia who presents to the ER for evaluation of left sided chest pain. She reports no prior chest pain until 1:30 PM yesterday when she got abrupt onset of pressure that radiated to jaw/teeth and arm. She came to the ER where EKG demonstrated no ST elevations. Initial TN 0.66 but subsequent TN 5 hours later 20 and downtrending to 16. She had TW abnormalities inferolaterally and echocardiogram showed an apical lateral/inferior hypokinesis but preserved LV function, no akinesis or scar formation. Repeat EKG today at time of presentation again without ST elevations. She received SL nitroglycerin with some relief, along with morphine IV. She reports 1/10 arm pain still present at time of interview. Delayed nitroglycerin infusion because no ICU beds at [facility] and unable initially to start on the floor. She was started on heparin, atorvastatin, metoprolol, morphine and lisinopril.     Plan:     1. Heparin infusion   2. Aspirin loaded, daily 81 mg aspirin   3. Soft blood pressures but will attempt low dose nitroglycerin infusion (special exception on the regular telemetry floor given lack of transfer bed options) - if unable to get completely chest/arm/jaw pain free will d/w interventional for earlier cath otherwise can wait until Monday if chest pain resolves with nitroglycerin  4. Echocardiogram reviewed  5. BP control with ACEI and beta blocker - can downtitrate to allow room for nitroglycerin infusion   6. Started atorvastatin for HLD     Encouraged patient to call RN anytime ongoing pain. We discussed the unfortunate bed placement situation occurring now as a result of the pandemic and will accommodate her if her clinical situation changes and she 1) becomes unstable 2) has refractory chest discomfort despite above medications 3)  EKG changes. Patient agreeable to plan and expressed understanding.     Pamela Knight MD  Staff Cardiologist     Code Status    Full Code    Reason for Consult   Chest pressure      Chief Complaint     Chest pressure     History of Present Illness     This is a pleasant 61 year old female with PMH significant for fibromyalgia who presents to the ER for evaluation of left sided chest pain. She reports no prior chest pain until 1:30 PM yesterday when she got abrupt onset of pressure that radiated to jaw/teeth and arm. She came to the ER where EKG demonstrated no ST elevations. Initial TN 0.66 but subsequent TN 5 hours later 20 and downtrending to 16. She had TW abnormalities inferolaterally and echocardiogram showed an apical lateral/inferior hypokinesis but preserved LV function, no akinesis or scar formation. Repeat EKG today at time of presentation again without ST elevations. She received SL nitroglycerin with some relief, along with morphine IV. She reports 1/10 arm pain still present at time of interview. Delay in nitroglycerin infusion because no ICU beds at [facility]. She was started on heparin , atorvastatin, metoprolol and lisinopril.     She reports non smoking history but has remarkable family history for CAD. No COVID illness recently, is vaccinated.     Past Medical History   I have reviewed this patient's medical history and updated it with pertinent information if needed.   No past medical history on file.    Past Surgical History   I have reviewed this patient's surgical history and updated it with pertinent information if needed.  No past surgical history on file.    Prior to Admission Medications   Prior to Admission Medications   Prescriptions Last Dose Informant Patient Reported? Taking?   DULoxetine (CYMBALTA) 20 MG capsule 11/19/2021 at x1  Yes Yes   Sig: Take 20 mg by mouth 2 times daily   acetaminophen (TYLENOL 8 HOUR) 650 MG CR tablet   No Yes   Sig: Take 1 tablet by mouth every 8 hours as  needed for pain.   cetirizine (ZYRTEC) 10 MG tablet 11/19/2021 at Unknown time  Yes Yes   Sig: Take 10 mg by mouth daily   cyanocobalamin (VITAMIN B-12) 1000 MCG tablet 11/19/2021 at Unknown time  Yes Yes   Sig: Take 1,000 mcg by mouth daily   ibuprofen (ADVIL/MOTRIN) 200 MG tablet 11/19/2021 at 1530  Yes Yes   Sig: Take 400 mg by mouth every 6 hours as needed for mild pain      Facility-Administered Medications: None     Allergies   No Known Allergies    Social History   I have reviewed this patient's social history and updated it with pertinent information if needed. Terese Gonzalez      Family History   I have reviewed this patient's family history and updated it with pertinent information if needed.   No family history on file.    Review of Systems   The 10 point Review of Systems is negative other than noted in the HPI or here.     Physical Exam   Temp: 97.9  F (36.6  C) Temp src: Oral BP: 95/63 Pulse: 63   Resp: 16 SpO2: 95 % O2 Device: None (Room air)    Vital Signs with Ranges  Temp:  [96  F (35.6  C)-97.9  F (36.6  C)] 97.9  F (36.6  C)  Pulse:  [57-92] 63  Resp:  [11-20] 16  BP: ()/() 95/63  SpO2:  [95 %-100 %] 95 %  199 lbs 9.6 oz        Data   Results for orders placed or performed during the hospital encounter of 11/19/21 (from the past 24 hour(s))   EKG 12 lead   Result Value Ref Range    Systolic Blood Pressure  mmHg    Diastolic Blood Pressure  mmHg    Ventricular Rate 69 BPM    Atrial Rate 69 BPM    SD Interval 144 ms    QRS Duration 82 ms     ms    QTc 413 ms    P Axis  degrees    R AXIS 8 degrees    T Axis 64 degrees    Interpretation ECG       Sinus rhythm  Normal ECG  When compared with ECG of 26-OCT-2014 17:00,  ST elevation now present in Inferior leads     CBC with platelets differential    Narrative    The following orders were created for panel order CBC with platelets differential.  Procedure                               Abnormality         Status                      ---------                               -----------         ------                     CBC with platelets and d...[[id number]]  Abnormal            Final result                 Please view results for these tests on the individual orders.   Troponin I   Result Value Ref Range    Troponin I 0.664 (HH) 0.000 - 0.045 ug/L   Basic metabolic panel   Result Value Ref Range    Sodium 136 133 - 144 mmol/L    Potassium 4.0 3.4 - 5.3 mmol/L    Chloride 103 94 - 109 mmol/L    Carbon Dioxide (CO2) 26 20 - 32 mmol/L    Anion Gap 7 3 - 14 mmol/L    Urea Nitrogen 20 7 - 30 mg/dL    Creatinine 0.74 0.52 - 1.04 mg/dL    Calcium 9.2 8.5 - 10.1 mg/dL    Glucose 100 (H) 70 - 99 mg/dL    GFR Estimate 88 >60 mL/min/1.73m2   D dimer quantitative   Result Value Ref Range    D-Dimer Quantitative 0.44 0.00 - 0.50 ug/mL FEU    Narrative    This D-dimer assay is intended for use in conjunction with a clinical pretest probability assessment model to exclude pulmonary embolism (PE) and deep venous thrombosis (DVT) in outpatients suspected of PE or DVT. The cut-off value is 0.50 ug/mL FEU.   [place] Draw    Narrative    The following orders were created for panel order [place] Draw.  Procedure                               Abnormality         Status                     ---------                               -----------         ------                     Extra Red Top Tube[[id number]]                               Final result                 Please view results for these tests on the individual orders.   CBC with platelets and differential   Result Value Ref Range    WBC Count 11.3 (H) 4.0 - 11.0 10e3/uL    RBC Count 4.88 3.80 - 5.20 10e6/uL    Hemoglobin 14.1 11.7 - 15.7 g/dL    Hematocrit 44.4 35.0 - 47.0 %    MCV 91 78 - 100 fL    MCH 28.9 26.5 - 33.0 pg    MCHC 31.8 31.5 - 36.5 g/dL    RDW 13.3 10.0 - 15.0 %    Platelet Count 293 150 - 450 10e3/uL    % Neutrophils 73 %    % Lymphocytes 20 %    % Monocytes 5 %    % Eosinophils 1 %    %  Basophils 0 %    % Immature Granulocytes 1 %    NRBCs per 100 WBC 0 <1 /100    Absolute Neutrophils 8.2 1.6 - 8.3 10e3/uL    Absolute Lymphocytes 2.3 0.8 - 5.3 10e3/uL    Absolute Monocytes 0.6 0.0 - 1.3 10e3/uL    Absolute Eosinophils 0.1 0.0 - 0.7 10e3/uL    Absolute Basophils 0.1 0.0 - 0.2 10e3/uL    Absolute Immature Granulocytes 0.1 (H) <=0.0 10e3/uL    Absolute NRBCs 0.0 10e3/uL   Extra Red Top Tube   Result Value Ref Range    Hold Specimen JIC    XR Chest 2 Views    Narrative    EXAM: XR CHEST 2 VW  LOCATION: [facility]  DATE/TIME: 11/19/2021 5:35 PM    INDICATION: Chest pain.  COMPARISON: None.      Impression    IMPRESSION: Small esophageal hiatal hernia. Previous cholecystectomy. Mild scoliosis. No pulmonary infiltrates. Normal heart size.   EKG 12-lead, tracing only   Result Value Ref Range    Systolic Blood Pressure  mmHg    Diastolic Blood Pressure  mmHg    Ventricular Rate 79 BPM    Atrial Rate 79 BPM    DE Interval 140 ms    QRS Duration 82 ms     ms    QTc 431 ms    P Axis 28 degrees    R AXIS -15 degrees    T Axis 36 degrees    Interpretation ECG Sinus rhythm  Normal ECG      Asymptomatic COVID-19 Virus (Coronavirus) by PCR Nasopharyngeal    Specimen: Nasopharyngeal; Swab   Result Value Ref Range    SARS CoV2 PCR Negative Negative    Narrative    Testing was performed using the [employer]  SARS-CoV-2 & Influenza A/B Assay on the [employer]  Ella  System.  This test should be ordered for the detection of SARS-COV-2 in individuals who meet SARS-CoV-2 clinical and/or epidemiological criteria. Test performance is unknown in asymptomatic patients.  This test is for in vitro diagnostic use under the FDA EUA for laboratories certified under CLIA to perform moderate and/or high complexity testing. This test has not been FDA cleared or approved.  A negative test does not rule out the presence of PCR inhibitors in the specimen or target RNA in concentration below the limit of detection for  the assay. The possibility of a false negative should be considered if the patient's recent exposure or clinical presentation suggests COVID-19.  [facility] are certified under the Clinical Laboratory Improvement Amendments of 1988 (CLIA-88) as qualified to perform moderate and/or high complexity laboratory testing.   Troponin I   Result Value Ref Range    Troponin I 16.841 (HH) 0.000 - 0.045 ug/L   Heparin Unfractionated Anti Xa Level   Result Value Ref Range    Anti Xa Unfractionated Heparin 0.32 For Reference Range, See Comment IU/mL    Narrative    Therapeutic Range: UFH: 0.25-0.50 IU/mL for low intensity dosing,  0.30-0.70 IU/mL for high intensity dosing DVT and PE.  This test is not validated for other direct factor X inhibitors (e.g. rivaroxaban, apixaban, edoxaban, betrixaban, fondaparinux) and should not be used for monitoring of other medications.   CBC with platelets differential    Narrative    The following orders were created for panel order CBC with platelets differential.  Procedure                               Abnormality         Status                     ---------                               -----------         ------                     CBC with platelets and d...[[id number]]  Abnormal            Final result                 Please view results for these tests on the individual orders.   Troponin I   Result Value Ref Range    Troponin I 20.386 (HH) 0.000 - 0.045 ug/L   Lipid panel reflex to direct LDL   Result Value Ref Range    Cholesterol 216 (H) <200 mg/dL    Triglycerides 147 <150 mg/dL    Direct Measure HDL 49 (L) >=50 mg/dL    LDL Cholesterol Calculated 138 (H) <=100 mg/dL    Non HDL Cholesterol 167 (H) <130 mg/dL    Narrative    Cholesterol  Desirable:  <200 mg/dL    Triglycerides  Normal:  Less than 150 mg/dL  Borderline High:  150-199 mg/dL  High:  200-499 mg/dL  Very High:  Greater than or equal to 500 mg/dL    Direct Measure HDL  Female:  Greater than or equal to  50 mg/dL   Male:  Greater than or equal to 40 mg/dL    LDL Cholesterol  Desirable:  <100mg/dL  Above Desirable:  100-129 mg/dL   Borderline High:  130-159 mg/dL   High:  160-189 mg/dL   Very High:  >= 190 mg/dL    Non HDL Cholesterol  Desirable:  130 mg/dL  Above Desirable:  130-159 mg/dL  Borderline High:  160-189 mg/dL  High:  190-219 mg/dL  Very High:  Greater than or equal to 220 mg/dL   Basic metabolic panel   Result Value Ref Range    Sodium 137 133 - 144 mmol/L    Potassium 4.2 3.4 - 5.3 mmol/L    Chloride 107 94 - 109 mmol/L    Carbon Dioxide (CO2) 25 20 - 32 mmol/L    Anion Gap 5 3 - 14 mmol/L    Urea Nitrogen 14 7 - 30 mg/dL    Creatinine 0.76 0.52 - 1.04 mg/dL    Calcium 8.7 8.5 - 10.1 mg/dL    Glucose 108 (H) 70 - 99 mg/dL    GFR Estimate 85 >60 mL/min/1.73m2   CBC with platelets and differential   Result Value Ref Range    WBC Count 8.7 4.0 - 11.0 10e3/uL    RBC Count 4.38 3.80 - 5.20 10e6/uL    Hemoglobin 12.5 11.7 - 15.7 g/dL    Hematocrit 40.2 35.0 - 47.0 %    MCV 92 78 - 100 fL    MCH 28.5 26.5 - 33.0 pg    MCHC 31.1 (L) 31.5 - 36.5 g/dL    RDW 13.2 10.0 - 15.0 %    Platelet Count 288 150 - 450 10e3/uL    % Neutrophils 55 %    % Lymphocytes 33 %    % Monocytes 8 %    % Eosinophils 2 %    % Basophils 1 %    % Immature Granulocytes 1 %    NRBCs per 100 WBC 0 <1 /100    Absolute Neutrophils 5.0 1.6 - 8.3 10e3/uL    Absolute Lymphocytes 2.8 0.8 - 5.3 10e3/uL    Absolute Monocytes 0.7 0.0 - 1.3 10e3/uL    Absolute Eosinophils 0.1 0.0 - 0.7 10e3/uL    Absolute Basophils 0.1 0.0 - 0.2 10e3/uL    Absolute Immature Granulocytes 0.0 <=0.0 10e3/uL    Absolute NRBCs 0.0 10e3/uL   Echocardiogram Complete   Result Value Ref Range    LVEF  55%     Narrative    [id number]  [id number]  [id number]  [id number]^LAURA^BERNARDO^MARSHA     [facility]  Echocardiography Laboratory  [address] [place] [address]     Name: LONG BAH GAUTAM  MRN: [id number]  : 1960  Study Date: 2021 07:20  AM  Age: 61 yrs  Gender: Female  Patient Location: [facility]  Reason For Study: Chest Pain, Chest Tightness, Chest Pressure  Ordering Physician: BERNARDO SANCHEZ  Performed By: Savannah Tejeda     BSA: 2.0 m2  Height: 68 in  Weight: 200 lb  HR: 60  BP: 111/80 mmHg  ______________________________________________________________________________  Procedure  Complete Portable Echo Adult.  ______________________________________________________________________________  Interpretation Summary     The visual ejection fraction is estimated at 55%.  distal posterolateral hypokinesis  The right ventricle is normal in structure, function and size.  There is trace to mild tricuspid regurgitation.  The patient exhibited occasional PVCs.  There is no comparison study available.  ______________________________________________________________________________  Left Ventricle  The left ventricle is normal in structure, function and size. There is normal  left ventricular wall thickness. The visual ejection fraction is estimated at  55%. Left ventricular diastolic function is normal. distal posterolateral  hypokinesis.     Right Ventricle  The right ventricle is normal in structure, function and size.     Atria  Normal left atrial size. Right atrial size is normal. There is no color  Doppler evidence of an atrial shunt.     Mitral Valve  The mitral valve leaflets are mildly thickened. There is no mitral  regurgitation noted.     Tricuspid Valve  The tricuspid valve is normal in structure and function. There is trace to  mild tricuspid regurgitation. The right ventricular systolic pressure is  approximated at 18.9 mmHg plus the right atrial pressure.     Aortic Valve  The aortic valve is normal in structure and function.     Pulmonic Valve  The pulmonic valve is not well visualized.     Vessels  Normal size aorta.     Pericardium  There is no pericardial effusion.     Rhythm  The patient exhibited occasional  PVCs.  ______________________________________________________________________________  MMode/2D Measurements & Calculations     IVSd: 1.0 cm  LVIDd: 5.4 cm  LVIDs: 3.3 cm  LVPWd: 0.67 cm  FS: 39.4 %  LV mass(C)d: 164.9 grams  LV mass(C)dI: 80.7 grams/m2  Ao root diam: 3.1 cm  LA dimension: 3.8 cm  asc Aorta Diam: 3.4 cm  LA/Ao: 1.2  LVOT diam: 2.0 cm  LVOT area: 3.1 cm2  LA Volume (BP): 49.2 ml  LA Volume Index (BP): 24.1 ml/m2  RWT: 0.25     Doppler Measurements & Calculations  MV E max tommy: 65.6 cm/sec  MV A max tommy: 64.8 cm/sec  MV E/A: 1.0  MV dec time: 0.16 sec  LV V1 max P.3 mmHg  LV V1 max: 115.0 cm/sec  LV V1 VTI: 25.1 cm  SV(LVOT): 78.9 ml  SI(LVOT): 38.6 ml/m2  PA acc time: 0.13 sec  TR max tommy: 215.3 cm/sec  TR max P.9 mmHg  E/E' av.0  Lateral E/e': 8.9  Medial E/e': 7.2     ______________________________________________________________________________  Report approved by: Rach Leong 2021 10:49 AM         Heparin Unfractionated Anti Xa Level   Result Value Ref Range    Anti Xa Unfractionated Heparin 0.24 For Reference Range, See Comment IU/mL    Narrative    Therapeutic Range: UFH: 0.25-0.50 IU/mL for low intensity dosing,  0.30-0.70 IU/mL for high intensity dosing DVT and PE.  This test is not validated for other direct factor X inhibitors (e.g. rivaroxaban, apixaban, edoxaban, betrixaban, fondaparinux) and should not be used for monitoring of other medications.   Troponin I   Result Value Ref Range    Troponin I 18.803 () 0.000 - 0.045 ug/L   EKG 12-lead, complete   Result Value Ref Range    Systolic Blood Pressure  mmHg    Diastolic Blood Pressure  mmHg    Ventricular Rate 60 BPM    Atrial Rate 60 BPM    NV Interval 138 ms    QRS Duration 82 ms     ms    QTc 424 ms    P Axis 52 degrees    R AXIS -9 degrees    T Axis 34 degrees    Interpretation ECG       Sinus rhythm  Normal ECG  When compared with ECG of 2021 18:27, (unconfirmed)  No significant change was  found

## 2021-11-20 NOTE — H&P
[facility]    Hospitalist History and Physical    Name: Terese Gonzalez    MRN: [id number]  YOB: 1960    Age: 61 year old  Date of Admission:  11/19/2021  Date of Service (when I saw the patient): 11/19/21    Assessment & Plan   Terese Gonzalez is a 61 year old relatively healthy female except for history of fibromyalgia who presents to the ED on 11/19/21 for evaluation of left sided chest pain.     ED workup reveals: intermittent hypertensive otherwise VSS, BMP unremarkable, initial troponin of 0.664, glucose of 100, WBC of 11.3 otherwise CBC WNL, D-dimer of 0.44, UA shows rate of 69 bpm in NSR with nonspecific T wave abnormality in III, AVL, AVF at 16:15 with repeat showing NSR, and chest x-ray shows small esophageal hiatal hernia, no pulmonary infiltrates, and normal heart size.    #NSTEMI: acute onset of left sided chest pain described as indigestion with radiation into left arm and lower left shoulder blade around 13:30 today with associated nausea and pain in right lower jaw/tooth. Pain seemed worse with deep inhalation. No prior episodes similar to this. No h/o CAD, HTN, HLD, DM, or tobacco abuse. Family history of CAD in father and grandfather. EKG without overt ischemic changes. Initial troponin elevated at 0.664. Symptoms with elevated troponin concerning for ACS, due to this the patient received  mg and 3 sublingual nitroglycerin in the ED with improvement but not complete resolution of chest pain in the ED. Patient was initiated on IV Heparin gtt in ED.   - monitor on telemetry  - daily ASA  - start low dose Metoprolol and Lisinopril with parameters  - Atorvastatin 40 mg daily   - check fasting lipid panel in AM  - serial troponin levels   - continue IV Heparin gtt (started in ED)  - trial nitroglycerin patch due to ongoing mild chest discomfort  - PRN IV morphine for ongoing chest pain with patch in place   - obtain echocardiogram  - cardiology consult for  recommendations     #Fibromyalgia: resume PTA Cymbalta     Clinically Significant Risk Factors Present on Admission                 COVID: tested negative on 11/19/21, patient is fully vaccinated against COVID  DVT Prophylaxis: Heparin gtt  Code Status: Full Code, discussed with patient   Disposition: Expected stay >2 midnights, will admit to inpatient     Primary Care Physician   Dr. Parada at [facility]    Chief Complaint   Chest pain    History obtained from discussion with ED provider, Caleb Dow PA-C, chart review, and interview with patient.     History of Present Illness   Terese Gonzalez is a 61 year old female who presents with left sided chest pain. The patient states that she was driving home around 13:30 when she had onset of left-sided chest pain with radiation into her left arm and her left lower shoulder blade. Denies radiation into left jaw or neck. She reports associated nausea without vomiting and right lower jaw/tooth pain. She describes her pain feeling similar to indigestion. Denies associated diaphoresis, dizziness, lightheadedness, palpitations, or shortness of breath. She did feel it was made worse by taking a deep inhalation. She states that her pain was up to a 7-8/10 initially. The patient states she tried laying down and took 2 Advil and waited approximately 15-20 minutes without significant improvement, thus came in for evaluation. Denies any previous episodes similar to this. Denies any recent heavy lifting or straining. The patient states she did have upper respiratory symptoms about 3 weeks ago and has an ongoing dry cough since then. Patient exercises regularly and last was walking with her horse earlier today and did not have any chest pain at that time. Denies h/o CAD, HTN, or DM. Family history of CAD in father and grandfather. The patient received 3 sublingual nitroglycerin which improved her pain from a 7 down to a 1/10 which is very comfortable for her at this time.      Past Medical History    Fibromyalgia    Past Surgical History   Reviewed with patient and noncontributory.     Prior to Admission Medications   Prior to Admission Medications   Prescriptions Last Dose Informant Patient Reported? Taking?   DULoxetine (CYMBALTA) 20 MG capsule 11/19/2021 at x1  Yes Yes   Sig: Take 20 mg by mouth 2 times daily   acetaminophen (TYLENOL 8 HOUR) 650 MG CR tablet   No Yes   Sig: Take 1 tablet by mouth every 8 hours as needed for pain.   cetirizine (ZYRTEC) 10 MG tablet 11/19/2021 at Unknown time  Yes Yes   Sig: Take 10 mg by mouth daily   cyanocobalamin (VITAMIN B-12) 1000 MCG tablet 11/19/2021 at Unknown time  Yes Yes   Sig: Take 1,000 mcg by mouth daily   ibuprofen (ADVIL/MOTRIN) 200 MG tablet 11/19/2021 at 1530  Yes Yes   Sig: Take 400 mg by mouth every 6 hours as needed for mild pain      Facility-Administered Medications: None     Allergies   No Known Allergies    Social History   Social History     Tobacco Use     Smoking status: Not on file     Smokeless tobacco: Not on file   Substance Use Topics     Alcohol use: Not on file     Social History     Social History Narrative     Not on file     Family History   Family history reviewed with patient and significant for father and grandfather with heart disease.    Review of Systems   A Comprehensive greater than 10 system review of systems was carried out.  Pertinent positives and negatives are noted above.  Otherwise negative for contributory information.    Physical Exam   Temp: 97.5  F (36.4  C) Temp src: Temporal BP: 130/77 Pulse: 70   Resp: 14 SpO2: 97 % O2 Device: None (Room air)    Vital Signs with Ranges  Temp:  [97.5  F (36.4  C)] 97.5  F (36.4  C)  Pulse:  [70-92] 70  Resp:  [12-20] 14  BP: (125-164)/() 130/77  SpO2:  [97 %-100 %] 97 %  200 lbs 6.37 oz    GEN:  Alert, oriented x 3, appears comfortable sitting up on gurney, no overt distress  HEENT:  Normocephalic/atraumatic, no scleral icterus, no nasal discharge,  mouth moist.  CV:  Regular rate and rhythm, no murmur or JVD.  S1 + S2 noted, no S3 or S4.  Chest: mildly reproducible pain over medial central aspect of left breast.  LUNGS:  Clear to auscultation bilaterally without rales/rhonchi/wheezing/retractions.  Symmetric chest rise on inhalation noted.  ABD:  Active bowel sounds, soft, non-tender/non-distended.  No rebound/guarding/rigidity.  EXT:  No edema.  No cyanosis.  No acute joint synovitis noted.  SKIN:  Dry to touch, no exanthems noted in the visualized areas.  NEURO:  Symmetric muscle strength, sensation to touch grossly intact.  Coordination symmetric on general exam.  No new focal deficits appreciated.    Data   Data reviewed today:  I personally reviewed the EKG tracing showing rate of 69 bpm in NSR with nonspecific T wave abnormality in III, AVL, AVF at 16:15 with repeat showing NSR.    Results for orders placed or performed during the hospital encounter of 11/19/21   XR Chest 2 Views     Status: None    Narrative    EXAM: XR CHEST 2 VW  LOCATION: [facility]  DATE/TIME: 11/19/2021 5:35 PM    INDICATION: Chest pain.  COMPARISON: None.      Impression    IMPRESSION: Small esophageal hiatal hernia. Previous cholecystectomy. Mild scoliosis. No pulmonary infiltrates. Normal heart size.   Troponin I     Status: Abnormal   Result Value Ref Range    Troponin I 0.664 (HH) 0.000 - 0.045 ug/L   Basic metabolic panel     Status: Abnormal   Result Value Ref Range    Sodium 136 133 - 144 mmol/L    Potassium 4.0 3.4 - 5.3 mmol/L    Chloride 103 94 - 109 mmol/L    Carbon Dioxide (CO2) 26 20 - 32 mmol/L    Anion Gap 7 3 - 14 mmol/L    Urea Nitrogen 20 7 - 30 mg/dL    Creatinine 0.74 0.52 - 1.04 mg/dL    Calcium 9.2 8.5 - 10.1 mg/dL    Glucose 100 (H) 70 - 99 mg/dL    GFR Estimate 88 >60 mL/min/1.73m2   D dimer quantitative     Status: Normal   Result Value Ref Range    D-Dimer Quantitative 0.44 0.00 - 0.50 ug/mL FEU    Narrative    This D-dimer assay is  intended for use in conjunction with a clinical pretest probability assessment model to exclude pulmonary embolism (PE) and deep venous thrombosis (DVT) in outpatients suspected of PE or DVT. The cut-off value is 0.50 ug/mL FEU.   CBC with platelets and differential     Status: Abnormal   Result Value Ref Range    WBC Count 11.3 (H) 4.0 - 11.0 10e3/uL    RBC Count 4.88 3.80 - 5.20 10e6/uL    Hemoglobin 14.1 11.7 - 15.7 g/dL    Hematocrit 44.4 35.0 - 47.0 %    MCV 91 78 - 100 fL    MCH 28.9 26.5 - 33.0 pg    MCHC 31.8 31.5 - 36.5 g/dL    RDW 13.3 10.0 - 15.0 %    Platelet Count 293 150 - 450 10e3/uL    % Neutrophils 73 %    % Lymphocytes 20 %    % Monocytes 5 %    % Eosinophils 1 %    % Basophils 0 %    % Immature Granulocytes 1 %    NRBCs per 100 WBC 0 <1 /100    Absolute Neutrophils 8.2 1.6 - 8.3 10e3/uL    Absolute Lymphocytes 2.3 0.8 - 5.3 10e3/uL    Absolute Monocytes 0.6 0.0 - 1.3 10e3/uL    Absolute Eosinophils 0.1 0.0 - 0.7 10e3/uL    Absolute Basophils 0.1 0.0 - 0.2 10e3/uL    Absolute Immature Granulocytes 0.1 (H) <=0.0 10e3/uL    Absolute NRBCs 0.0 10e3/uL   Extra Red Top Tube     Status: None   Result Value Ref Range    Hold Specimen [facility]    EKG 12 lead     Status: None (Preliminary result)   Result Value Ref Range    Systolic Blood Pressure  mmHg    Diastolic Blood Pressure  mmHg    Ventricular Rate 69 BPM    Atrial Rate 69 BPM    RI Interval 144 ms    QRS Duration 82 ms     ms    QTc 413 ms    P Axis  degrees    R AXIS 8 degrees    T Axis 64 degrees    Interpretation ECG       Sinus rhythm  Normal ECG  When compared with ECG of 26-OCT-2014 17:00,  ST elevation now present in Inferior leads     EKG 12-lead, tracing only     Status: None (Preliminary result)   Result Value Ref Range    Systolic Blood Pressure  mmHg    Diastolic Blood Pressure  mmHg    Ventricular Rate 79 BPM    Atrial Rate 79 BPM    RI Interval 140 ms    QRS Duration 82 ms     ms    QTc 431 ms    P Axis 28 degrees    R AXIS -15  degrees    T Axis 36 degrees    Interpretation ECG Sinus rhythm  Normal ECG      CBC with platelets differential     Status: Abnormal    Narrative    The following orders were created for panel order CBC with platelets differential.  Procedure                               Abnormality         Status                     ---------                               -----------         ------                     CBC with platelets and d...[[id number]]  Abnormal            Final result                 Please view results for these tests on the individual orders.   [place] Draw     Status: None    Narrative    The following orders were created for panel order [place] Draw.  Procedure                               Abnormality         Status                     ---------                               -----------         ------                     Extra Red Top Tube[[id number]]                               Final result                 Please view results for these tests on the individual orders.     Jessa Bell [facility]   I discussed the patient with Dr. Langford and he agrees with the above plan.

## 2021-11-20 NOTE — ED NOTES
[facility]  ED Nurse Handoff Report    Terese Gonzalez is a 61 year old female   ED Chief complaint: Chest Pain and Arm Pain  . ED Diagnosis:   Final diagnoses:   NSTEMI (non-ST elevated myocardial infarction) (H)     Allergies: No Known Allergies    Code Status: Full Code  Activity level - Baseline/Home:  Independent. Activity Level - Current:   Independent. Lift room needed: No. Bariatric: No   Needed: No   Isolation: No. Infection: Not Applicable.     Vital Signs:   Vitals:    11/19/21 1850 11/19/21 1858 11/19/21 1900 11/19/21 1920   BP: 130/89 (!) 125/92 (!) 134/98 130/77   Pulse: 75 79 73 70   Resp: 12 16 17 14   Temp:       TempSrc:       SpO2: 100% 97% 98% 97%   Weight:           Cardiac Rhythm:  ,      Pain level:    Patient confused: No. Patient Falls Risk: No.   Elimination Status: Has voided   Patient Report - Initial Complaint: Chest Pain. Focused Assessment:   Pt presents today with left sided chest pain around 1300. Pt states she has never had this pain before, does have a family history of CAD. Pt complained of 7/10 pain radiating into her left arm. Pt now with 3 doses of nitro and pain is now 3/10.. Pt is A&O x4, GCS-15.  Tests Performed:   Labs Ordered and Resulted from Time of ED Arrival to Time of ED Departure   TROPONIN I - Abnormal       Result Value    Troponin I 0.664 (*)    BASIC METABOLIC PANEL - Abnormal    Sodium 136      Potassium 4.0      Chloride 103      Carbon Dioxide (CO2) 26      Anion Gap 7      Urea Nitrogen 20      Creatinine 0.74      Calcium 9.2      Glucose 100 (*)     GFR Estimate 88     CBC WITH PLATELETS AND DIFFERENTIAL - Abnormal    WBC Count 11.3 (*)     RBC Count 4.88      Hemoglobin 14.1      Hematocrit 44.4      MCV 91      MCH 28.9      MCHC 31.8      RDW 13.3      Platelet Count 293      % Neutrophils 73      % Lymphocytes 20      % Monocytes 5      % Eosinophils 1      % Basophils 0      % Immature Granulocytes 1      NRBCs per 100 WBC 0       Absolute Neutrophils 8.2      Absolute Lymphocytes 2.3      Absolute Monocytes 0.6      Absolute Eosinophils 0.1      Absolute Basophils 0.1      Absolute Immature Granulocytes 0.1 (*)     Absolute NRBCs 0.0     D DIMER QUANTITATIVE - Normal    D-Dimer Quantitative 0.44     COVID-19 VIRUS (CORONAVIRUS) BY PCR     XR Chest 2 Views   Final Result   IMPRESSION: Small esophageal hiatal hernia. Previous cholecystectomy. Mild scoliosis. No pulmonary infiltrates. Normal heart size.        . Abnormal Results: Trop 0.66.   Treatments provided: SL nitroglycerin, IV Heparin  Family Comments:  at bedside.  OBS brochure/video discussed/provided to patient:  No  ED Medications:   Medications   heparin infusion 25,000 units in D5W 250 mL ANTICOAGULANT (1,100 Units/hr Intravenous New Bag 11/19/21 1926)   nitroGLYcerin (NITROSTAT) sublingual tablet 0.4 mg (0.4 mg Sublingual Given 11/19/21 1915)   aspirin (ASA) chewable tablet 324 mg (324 mg Oral Given 11/19/21 1848)   heparin loading dose for LOW INTENSITY TREATMENT * Give BEFORE starting heparin infusion (5,450 Units Intravenous Given 11/19/21 1926)     Drips infusing:  Yes, Heparin.  For the majority of the shift, the patient's behavior Green. Interventions performed were Call light within reach, pt using call light appropriately.    Sepsis treatment initiated: No     Patient tested for COVID 19 prior to admission: YES    ED Nurse Name/Phone Number: Keith Alfaro RN,   7:30 PM    RECEIVING UNIT ED HANDOFF REVIEW    Above ED Nurse Handoff Report was reviewed: Yes  Reviewed by: Yadira Rand RN on November 19, 2021 at 10:15 PM

## 2021-11-20 NOTE — PROGRESS NOTES
After nitroglycerin infusion, Pt is feeling much comfortable in regards to chest pain. Pt reports not quite pain free, but just feels tiny dot pain on left side. Hospitalist (Salomon) is aware and told not to titrate more on Nitroglycerin from current rate (40mvg/min). Pt VSS with MAP greater than 65. Will continue to monitor and assess.

## 2021-11-20 NOTE — ED PROVIDER NOTES
Emergency Department Attending Supervision Note  11/19/2021  6:35 PM      I evaluated this patient in conjunction with Caleb Dow PA-C      Briefly, the patient presented with sudden on set left CP @ 1330 radiating to left shoulder. No dyspnea.  She notes that she had been working with her horses prior to this but was not doing anything exertional.      On my exam,     Patient Vitals for the past 24 hrs:   BP Temp Temp src Pulse Resp SpO2 Weight   11/19/21 2200 111/80 -- -- 67 12 97 % --   11/19/21 2145 128/78 -- -- 67 19 97 % --   11/19/21 2130 137/82 -- -- 57 20 98 % --   11/19/21 2115 131/74 -- -- 60 18 98 % --   11/19/21 2100 131/86 -- -- 64 17 98 % --   11/19/21 2030 (!) 126/91 -- -- 66 11 100 % --   11/19/21 2000 121/77 -- -- 64 16 98 % --   11/19/21 1920 130/77 -- -- 70 14 97 % --   11/19/21 1900 (!) 134/98 -- -- 73 17 98 % --   11/19/21 1858 (!) 125/92 -- -- 79 16 97 % --   11/19/21 1850 130/89 -- -- 75 12 100 % --   11/19/21 1845 -- -- -- -- -- -- 90.9 kg (200 lb 6.4 oz)   11/19/21 1830 (!) 144/105 -- -- 80 18 100 % --   11/19/21 1730 134/86 -- -- 70 12 100 % --   11/19/21 1630 (!) 161/91 -- -- 92 14 100 % --   11/19/21 1615 (!) 164/101 97.5  F (36.4  C) Temporal 75 20 99 % --       Constitutional: Vital signs reviewed as above.   Head: No external signs of trauma noted.  Eyes: Pupils are equal, round, and reactive to light.   Neck: No JVD noted  Cardiovascular: Normal rate, regular rhythm and normal heart sounds.  No murmur heard. Equal B/L peripheral pulses.  Pulmonary/Chest: Effort normal and breath sounds normal. No respiratory distress. Patient has no wheezes. Patient has no rales.   Gastrointestinal: Soft. There is no tenderness.   Musculoskeletal/Extremities: No edema noted. Normal tone.  Neurological: Patient is alert and oriented to person, place, and time.   Skin: Skin is warm and dry. There is no diaphoresis noted.   Psychiatric: The patient appears calm.    Results:    XR Chest 2 Views   Final  Result   IMPRESSION: Small esophageal hiatal hernia. Previous cholecystectomy. Mild scoliosis. No pulmonary infiltrates. Normal heart size.          Labs Ordered and Resulted from Time of ED Arrival to Time of ED Departure   TROPONIN I - Abnormal       Result Value    Troponin I 0.664 (*)    BASIC METABOLIC PANEL - Abnormal    Sodium 136      Potassium 4.0      Chloride 103      Carbon Dioxide (CO2) 26      Anion Gap 7      Urea Nitrogen 20      Creatinine 0.74      Calcium 9.2      Glucose 100 (*)     GFR Estimate 88     CBC WITH PLATELETS AND DIFFERENTIAL - Abnormal    WBC Count 11.3 (*)     RBC Count 4.88      Hemoglobin 14.1      Hematocrit 44.4      MCV 91      MCH 28.9      MCHC 31.8      RDW 13.3      Platelet Count 293      % Neutrophils 73      % Lymphocytes 20      % Monocytes 5      % Eosinophils 1      % Basophils 0      % Immature Granulocytes 1      NRBCs per 100 WBC 0      Absolute Neutrophils 8.2      Absolute Lymphocytes 2.3      Absolute Monocytes 0.6      Absolute Eosinophils 0.1      Absolute Basophils 0.1      Absolute Immature Granulocytes 0.1 (*)     Absolute NRBCs 0.0     D DIMER QUANTITATIVE - Normal    D-Dimer Quantitative 0.44     COVID-19 VIRUS (CORONAVIRUS) BY PCR - Normal    SARS CoV2 PCR Negative         ED course:    Medications   heparin infusion 25,000 units in D5W 250 mL ANTICOAGULANT (1,100 Units/hr Intravenous New Bag 11/19/21 1926)   nitroGLYcerin (NITROSTAT) sublingual tablet 0.4 mg (0.4 mg Sublingual Given 11/19/21 1915)   aspirin (ASA) chewable tablet 324 mg (324 mg Oral Given 11/19/21 1848)   heparin loading dose for LOW INTENSITY TREATMENT * Give BEFORE starting heparin infusion (5,450 Units Intravenous Given 11/19/21 1926)       ED Course as of 11/19/21 2214 Fri Nov 19, 2021   1835 Dr. Palacios discussed the case with Caleb Dow PA-C. Discussed presentation, exam, ddx, plan.       1838 Dr. Palacios' evaluation.  Discussed results so far and anticipated plan of care with the  patient and her .     Case was discussed between Caleb Dow PA-C and the hospitalist.  She will be admitted on a heparin infusion for ongoing monitoring and care as well as additional work-up tomorrow.    Impression:    ICD-10-CM    1. NSTEMI (non-ST elevated myocardial infarction) (H)  I21.4          Caleb Dow PA-C Burns, Bradley Joseph,   11/19/21 2214

## 2021-11-20 NOTE — PROGRESS NOTES
Reported to Hansa ADAN 6C (Tele/Cardio) at the [facility]. Report given to Betty ADAN. Will continue to monitor and assess.

## 2021-11-20 NOTE — PHARMACY-ADMISSION MEDICATION HISTORY
Admission medication history interview status for this patient is complete. See [facility] admission navigator for allergy information, prior to admission medications and immunization status.     Medication history interview done, indicate source(s): Patient  Medication history resources (including written lists, pill bottles, clinic record):None  Pharmacy: -    Changes made to PTA medication list:  Added: zyrtec, vit b12, duloxetine  Changed: ibuprofen to 400 mg prn  Reported as Not Taking: -  Removed: -    Actions taken by pharmacist (provider contacted, etc):None     Additional medication history information:None    Medication reconciliation/reorder completed by provider prior to medication history?  no   (Y/N)     For patients on insulin therapy:   Do you use sliding scale insulin based on blood sugars?   What is your pre-meal insulin coverage?    Do you typically eat three meals a day?   How many times do you check your blood glucose per day?   How many episodes of hypoglycemia do you typically have per month?   Do you have a Continuous Glucose Monitor (CGM)?      Prior to Admission medications    Medication Sig Last Dose Taking? Auth Provider   acetaminophen (TYLENOL 8 HOUR) 650 MG CR tablet Take 1 tablet by mouth every 8 hours as needed for pain.  Yes Brett Jj MD   cetirizine (ZYRTEC) 10 MG tablet Take 10 mg by mouth daily 11/19/2021 at Unknown time Yes Unknown, Entered By History   cyanocobalamin (VITAMIN B-12) 1000 MCG tablet Take 1,000 mcg by mouth daily 11/19/2021 at Unknown time Yes Unknown, Entered By History   DULoxetine (CYMBALTA) 20 MG capsule Take 20 mg by mouth 2 times daily 11/19/2021 at x1 Yes Unknown, Entered By History   ibuprofen (ADVIL/MOTRIN) 200 MG tablet Take 400 mg by mouth every 6 hours as needed for mild pain 11/19/2021 at 1530 Yes Unknown, Entered By History

## 2021-11-20 NOTE — PLAN OF CARE
"VSS, on RA. On arrival to unit, pt reporting 1/10 left sided chest pain with radiation to left arm. Nitroglycerin patch applied. 0030 troponin resulted at 16.841 and pt reporting increased chest pain rated 4/10. MD notified, and prn IV morphine given (total of 2 doses given this shift). Per pt, this lowers pain to \"between 0-1/10.\" Tele SB. Continues on heparin gtt @1100 units/hr. Pt has been NPO since midnight. Up with SBA.   "

## 2021-11-20 NOTE — PROVIDER NOTIFICATION
Pt's troponin 16.841, up from 0.664 previously. Reports increase in chest pain, now 4/10. Has nitroglycerin patch in place, heparin gtt infusing. Thx!

## 2021-11-21 ENCOUNTER — APPOINTMENT (OUTPATIENT)
Dept: ULTRASOUND IMAGING | Facility: CLINIC | Age: 61
End: 2021-11-21
Attending: INTERNAL MEDICINE
Payer: COMMERCIAL

## 2021-11-21 LAB
ALBUMIN SERPL-MCNC: 3.4 G/DL (ref 3.4–5)
ALP SERPL-CCNC: 109 U/L (ref 40–150)
ALT SERPL W P-5'-P-CCNC: 129 U/L (ref 0–50)
ANION GAP SERPL CALCULATED.3IONS-SCNC: 7 MMOL/L (ref 3–14)
AST SERPL W P-5'-P-CCNC: 147 U/L (ref 0–45)
ATRIAL RATE - MUSE: 57 BPM
BILIRUB SERPL-MCNC: 0.9 MG/DL (ref 0.2–1.3)
BUN SERPL-MCNC: 13 MG/DL (ref 7–30)
CALCIUM SERPL-MCNC: 9.2 MG/DL (ref 8.5–10.1)
CHLORIDE BLD-SCNC: 105 MMOL/L (ref 94–109)
CO2 SERPL-SCNC: 24 MMOL/L (ref 20–32)
CREAT SERPL-MCNC: 0.75 MG/DL (ref 0.52–1.04)
DIASTOLIC BLOOD PRESSURE - MUSE: NORMAL MMHG
ERYTHROCYTE [DISTWIDTH] IN BLOOD BY AUTOMATED COUNT: 13.3 % (ref 10–15)
GFR SERPL CREATININE-BSD FRML MDRD: 86 ML/MIN/1.73M2
GLUCOSE BLD-MCNC: 106 MG/DL (ref 70–99)
HBA1C MFR BLD: 5.1 % (ref 0–5.6)
HCT VFR BLD AUTO: 40.5 % (ref 35–47)
HGB BLD-MCNC: 12.9 G/DL (ref 11.7–15.7)
INTERPRETATION ECG - MUSE: NORMAL
MCH RBC QN AUTO: 28.9 PG (ref 26.5–33)
MCHC RBC AUTO-ENTMCNC: 31.9 G/DL (ref 31.5–36.5)
MCV RBC AUTO: 91 FL (ref 78–100)
P AXIS - MUSE: 44 DEGREES
PLATELET # BLD AUTO: 263 10E3/UL (ref 150–450)
POTASSIUM BLD-SCNC: 4.4 MMOL/L (ref 3.4–5.3)
PR INTERVAL - MUSE: 130 MS
PROT SERPL-MCNC: 6.9 G/DL (ref 6.8–8.8)
QRS DURATION - MUSE: 84 MS
QT - MUSE: 442 MS
QTC - MUSE: 430 MS
R AXIS - MUSE: -12 DEGREES
RBC # BLD AUTO: 4.47 10E6/UL (ref 3.8–5.2)
SARS-COV-2 RNA RESP QL NAA+PROBE: NEGATIVE
SODIUM SERPL-SCNC: 136 MMOL/L (ref 133–144)
SYSTOLIC BLOOD PRESSURE - MUSE: NORMAL MMHG
T AXIS - MUSE: 2 DEGREES
UFH PPP CHRO-ACNC: 0.4 IU/ML
VENTRICULAR RATE- MUSE: 57 BPM
WBC # BLD AUTO: 9.7 10E3/UL (ref 4–11)

## 2021-11-21 PROCEDURE — 250N000013 HC RX MED GY IP 250 OP 250 PS 637: Performed by: STUDENT IN AN ORGANIZED HEALTH CARE EDUCATION/TRAINING PROGRAM

## 2021-11-21 PROCEDURE — 258N000003 HC RX IP 258 OP 636: Performed by: STUDENT IN AN ORGANIZED HEALTH CARE EDUCATION/TRAINING PROGRAM

## 2021-11-21 PROCEDURE — 214N000001 HC R&B CCU UMMC

## 2021-11-21 PROCEDURE — 250N000013 HC RX MED GY IP 250 OP 250 PS 637: Performed by: INTERNAL MEDICINE

## 2021-11-21 PROCEDURE — 80051 ELECTROLYTE PANEL: CPT | Performed by: STUDENT IN AN ORGANIZED HEALTH CARE EDUCATION/TRAINING PROGRAM

## 2021-11-21 PROCEDURE — 36415 COLL VENOUS BLD VENIPUNCTURE: CPT | Performed by: INTERNAL MEDICINE

## 2021-11-21 PROCEDURE — 93880 EXTRACRANIAL BILAT STUDY: CPT | Mod: 26 | Performed by: RADIOLOGY

## 2021-11-21 PROCEDURE — C1887 CATHETER, GUIDING: HCPCS | Performed by: INTERNAL MEDICINE

## 2021-11-21 PROCEDURE — U0005 INFEC AGEN DETEC AMPLI PROBE: HCPCS | Performed by: STUDENT IN AN ORGANIZED HEALTH CARE EDUCATION/TRAINING PROGRAM

## 2021-11-21 PROCEDURE — 272N000001 HC OR GENERAL SUPPLY STERILE: Performed by: INTERNAL MEDICINE

## 2021-11-21 PROCEDURE — C1894 INTRO/SHEATH, NON-LASER: HCPCS | Performed by: INTERNAL MEDICINE

## 2021-11-21 PROCEDURE — 99223 1ST HOSP IP/OBS HIGH 75: CPT | Mod: 25 | Performed by: INTERNAL MEDICINE

## 2021-11-21 PROCEDURE — 250N000011 HC RX IP 250 OP 636: Performed by: INTERNAL MEDICINE

## 2021-11-21 PROCEDURE — 85014 HEMATOCRIT: CPT | Performed by: STUDENT IN AN ORGANIZED HEALTH CARE EDUCATION/TRAINING PROGRAM

## 2021-11-21 PROCEDURE — 93458 L HRT ARTERY/VENTRICLE ANGIO: CPT | Performed by: INTERNAL MEDICINE

## 2021-11-21 PROCEDURE — 85520 HEPARIN ASSAY: CPT | Performed by: INTERNAL MEDICINE

## 2021-11-21 PROCEDURE — 250N000011 HC RX IP 250 OP 636: Performed by: STUDENT IN AN ORGANIZED HEALTH CARE EDUCATION/TRAINING PROGRAM

## 2021-11-21 PROCEDURE — 250N000009 HC RX 250: Performed by: INTERNAL MEDICINE

## 2021-11-21 PROCEDURE — 93880 EXTRACRANIAL BILAT STUDY: CPT

## 2021-11-21 PROCEDURE — 83036 HEMOGLOBIN GLYCOSYLATED A1C: CPT | Performed by: STUDENT IN AN ORGANIZED HEALTH CARE EDUCATION/TRAINING PROGRAM

## 2021-11-21 PROCEDURE — 80053 COMPREHEN METABOLIC PANEL: CPT | Performed by: STUDENT IN AN ORGANIZED HEALTH CARE EDUCATION/TRAINING PROGRAM

## 2021-11-21 PROCEDURE — 99152 MOD SED SAME PHYS/QHP 5/>YRS: CPT | Performed by: INTERNAL MEDICINE

## 2021-11-21 RX ORDER — OXYCODONE HYDROCHLORIDE 10 MG/1
10 TABLET ORAL EVERY 4 HOURS PRN
Status: DISCONTINUED | OUTPATIENT
Start: 2021-11-21 | End: 2021-11-22 | Stop reason: HOSPADM

## 2021-11-21 RX ORDER — OXYCODONE HYDROCHLORIDE 5 MG/1
5 TABLET ORAL EVERY 4 HOURS PRN
Status: DISCONTINUED | OUTPATIENT
Start: 2021-11-21 | End: 2021-11-22 | Stop reason: HOSPADM

## 2021-11-21 RX ORDER — ASPIRIN 325 MG
325 TABLET ORAL ONCE
Status: COMPLETED | OUTPATIENT
Start: 2021-11-21 | End: 2021-11-21

## 2021-11-21 RX ORDER — NALOXONE HYDROCHLORIDE 0.4 MG/ML
0.4 INJECTION, SOLUTION INTRAMUSCULAR; INTRAVENOUS; SUBCUTANEOUS
Status: ACTIVE | OUTPATIENT
Start: 2021-11-21 | End: 2021-11-21

## 2021-11-21 RX ORDER — ISOSORBIDE MONONITRATE 30 MG/1
30 TABLET, EXTENDED RELEASE ORAL DAILY
Status: DISCONTINUED | OUTPATIENT
Start: 2021-11-21 | End: 2021-11-22 | Stop reason: HOSPADM

## 2021-11-21 RX ORDER — NITROGLYCERIN 20 MG/100ML
10-200 INJECTION INTRAVENOUS CONTINUOUS
Status: DISCONTINUED | OUTPATIENT
Start: 2021-11-21 | End: 2021-11-22

## 2021-11-21 RX ORDER — IOPAMIDOL 755 MG/ML
INJECTION, SOLUTION INTRAVASCULAR
Status: DISCONTINUED | OUTPATIENT
Start: 2021-11-21 | End: 2021-11-21 | Stop reason: HOSPADM

## 2021-11-21 RX ORDER — FENTANYL CITRATE 50 UG/ML
INJECTION, SOLUTION INTRAMUSCULAR; INTRAVENOUS
Status: DISCONTINUED | OUTPATIENT
Start: 2021-11-21 | End: 2021-11-21 | Stop reason: HOSPADM

## 2021-11-21 RX ORDER — ATORVASTATIN CALCIUM 40 MG/1
40 TABLET, FILM COATED ORAL DAILY
Qty: 60 TABLET | Refills: 3 | Status: SHIPPED | OUTPATIENT
Start: 2021-11-21 | End: 2021-11-22

## 2021-11-21 RX ORDER — ATROPINE SULFATE 0.1 MG/ML
0.5 INJECTION INTRAVENOUS
Status: ACTIVE | OUTPATIENT
Start: 2021-11-21 | End: 2021-11-21

## 2021-11-21 RX ORDER — ISOSORBIDE MONONITRATE 30 MG/1
30 TABLET, EXTENDED RELEASE ORAL DAILY
Qty: 60 TABLET | Refills: 1 | Status: SHIPPED | OUTPATIENT
Start: 2021-11-22 | End: 2021-11-22

## 2021-11-21 RX ORDER — HEPARIN SODIUM 1000 [USP'U]/ML
INJECTION, SOLUTION INTRAVENOUS; SUBCUTANEOUS
Status: DISCONTINUED | OUTPATIENT
Start: 2021-11-21 | End: 2021-11-21 | Stop reason: HOSPADM

## 2021-11-21 RX ORDER — NALOXONE HYDROCHLORIDE 0.4 MG/ML
0.2 INJECTION, SOLUTION INTRAMUSCULAR; INTRAVENOUS; SUBCUTANEOUS
Status: ACTIVE | OUTPATIENT
Start: 2021-11-21 | End: 2021-11-21

## 2021-11-21 RX ORDER — FLUMAZENIL 0.1 MG/ML
0.2 INJECTION, SOLUTION INTRAVENOUS
Status: ACTIVE | OUTPATIENT
Start: 2021-11-21 | End: 2021-11-21

## 2021-11-21 RX ORDER — SODIUM CHLORIDE 9 MG/ML
75 INJECTION, SOLUTION INTRAVENOUS CONTINUOUS
Status: ACTIVE | OUTPATIENT
Start: 2021-11-21 | End: 2021-11-21

## 2021-11-21 RX ORDER — NITROGLYCERIN 5 MG/ML
VIAL (ML) INTRAVENOUS
Status: DISCONTINUED | OUTPATIENT
Start: 2021-11-21 | End: 2021-11-21 | Stop reason: HOSPADM

## 2021-11-21 RX ORDER — ACETAMINOPHEN 325 MG/1
975 TABLET ORAL EVERY 6 HOURS PRN
Status: DISCONTINUED | OUTPATIENT
Start: 2021-11-21 | End: 2021-11-22 | Stop reason: HOSPADM

## 2021-11-21 RX ORDER — FENTANYL CITRATE 50 UG/ML
25 INJECTION, SOLUTION INTRAMUSCULAR; INTRAVENOUS
Status: DISCONTINUED | OUTPATIENT
Start: 2021-11-21 | End: 2021-11-21

## 2021-11-21 RX ADMIN — NITROGLYCERIN 10 MCG/MIN: 20 INJECTION INTRAVENOUS at 10:00

## 2021-11-21 RX ADMIN — CETIRIZINE HYDROCHLORIDE 10 MG: 10 TABLET, FILM COATED ORAL at 09:11

## 2021-11-21 RX ADMIN — ACETAMINOPHEN 975 MG: 325 TABLET, FILM COATED ORAL at 10:38

## 2021-11-21 RX ADMIN — DULOXETINE HYDROCHLORIDE 20 MG: 20 CAPSULE, DELAYED RELEASE ORAL at 09:11

## 2021-11-21 RX ADMIN — ISOSORBIDE MONONITRATE 30 MG: 30 TABLET, EXTENDED RELEASE ORAL at 15:00

## 2021-11-21 RX ADMIN — SODIUM CHLORIDE 75 ML/HR: 9 INJECTION, SOLUTION INTRAVENOUS at 14:22

## 2021-11-21 RX ADMIN — HEPARIN SODIUM 1250 UNITS/HR: 1000 INJECTION INTRAVENOUS; SUBCUTANEOUS at 06:11

## 2021-11-21 RX ADMIN — DULOXETINE HYDROCHLORIDE 20 MG: 20 CAPSULE, DELAYED RELEASE ORAL at 20:01

## 2021-11-21 RX ADMIN — ACETAMINOPHEN 975 MG: 325 TABLET, FILM COATED ORAL at 17:27

## 2021-11-21 RX ADMIN — ASPIRIN 325 MG ORAL TABLET 325 MG: 325 PILL ORAL at 13:00

## 2021-11-21 RX ADMIN — Medication 1000 MCG: at 09:11

## 2021-11-21 RX ADMIN — ATORVASTATIN CALCIUM 40 MG: 40 TABLET, FILM COATED ORAL at 20:01

## 2021-11-21 ASSESSMENT — ACTIVITIES OF DAILY LIVING (ADL)
ADLS_ACUITY_SCORE: 6
ADLS_ACUITY_SCORE: 6
ADLS_ACUITY_SCORE: 4
ADLS_ACUITY_SCORE: 6
ADLS_ACUITY_SCORE: 4
ADLS_ACUITY_SCORE: 6
ADLS_ACUITY_SCORE: 6
ADLS_ACUITY_SCORE: 4
ADLS_ACUITY_SCORE: 6
ADLS_ACUITY_SCORE: 4
ADLS_ACUITY_SCORE: 6
ADLS_ACUITY_SCORE: 4
ADLS_ACUITY_SCORE: 4
ADLS_ACUITY_SCORE: 6
ADLS_ACUITY_SCORE: 4
ADLS_ACUITY_SCORE: 6
ADLS_ACUITY_SCORE: 6
ADLS_ACUITY_SCORE: 4
ADLS_ACUITY_SCORE: 6

## 2021-11-21 NOTE — PLAN OF CARE
Neuro: A&Ox4. Slept well overnight.  Cardiac: SR. VSS.   Respiratory: Sating 90s on RA.  GI/: Adequate urine output. No BM this shift.  Diet/appetite: Tolerating regular diet. Eating well. NPO since midnight.  Activity:  Up independently  Pain: Headache resolved with stopping Nitroglycerin drip. No further complaints of chest pain.  Skin: No new deficits noted.  LDA's: PIV infusing Heparin at 1250 units/hr.    Plan: Plan for carotid ultrasound today. Continue with POC. Notify primary team with changes.

## 2021-11-21 NOTE — PLAN OF CARE
Angiogram  D: Patient went to [facility] for angio and has clean cors. Patient returned with right radial TR band filled with 12cc air.  I: Frequent VS. Called [facility] for post cath orders.  A: VSS, right TR band in place.  P: Awaiting orders for removal.

## 2021-11-21 NOTE — H&P
[facility]    Cardiology History and Physical - CSI         Date of Admission:  11/20/2021    Assessment & Plan: SL    Terese Gonzalez is a 61 year old female with no significant PMHx who is admitted for coronary angiography due to NSTEMI.    ?Late presentation STEMI vs MINOCA vs Vasospastic  Patient's rising troponin in the setting of acute chest pain is consistent with NSTEMI which she was treated for with a heparin drip, a nitroglycerin drip and initially metoprolol and lisinopril (held to increase nitroglycerin drip for pain). Family history of CAD and  are risk factors for CAD in this patient. Denies smoking/drinking alcohol/using street drugs and stays active. Given that this is her first ACS she will require work-up notably cath and possible PCI.  - Continuing nitroglycerin and heparin drips  - NPO at [facility] for possible cath  - Continuing to hold metoprolol and lisinopril for now  - Continuing atorvastatin 50 mg daily  - Carotid duplex US in AM to r/o carotid disease in setting of recent transient vision loss (although unlikely given bilateral vision loss)  - No need to trend troponin as it already peaked    Patient with chest pain starting on 11/16/21 which was sudden,pressure like and radiating to the left arm. She has family history of CAD, her father had an MI in his 70s. EKG on 11/19/21 @ 1600 showed ST elevation in lead III and aVF with some reciprocal changes. Furthermore, echocardiogram clearly showed inferolateral wall motion abnormality. Troponin up from 0.6->16.  She was given full dose of aspirin and started on heparin drip. On arrival here still with chest pressure, nitroglycerin drip started. Taken to cath lab, which did not show any significant abnormalities or culprit lesion. Symptoms did improve with nitroglycerin, therefore coronary vasospasm could have been the cause. MINOCA can also cause ST changes with troponin elevation and  wall motion abnormalities. Other causes could be myocarditis, stress, etc. Will order cMRI as an outpatient to expand work up. As for chest pain will start long term nitrate.     Fibromyalgia  - Continue PTA duloxetine       Diet: regular, NPO at MN  DVT Prophylaxis: Heparin drip  Gabriel Catheter: Not present  Code Status: Full code, discussed with patient on admission  Fluids: none  Lines: PIV x2     Reny Colón MD   PGY-2 internal medicine  Cards  nights  [facility]  Please see sign in/sign out for up to date coverage information    Patient evaluated and discussed with Dr. Cope.     Margarito Valderrama, [facility]  Cardiology Fellow    Disposition Plan   Expected discharge: 2 - 3 days, recommended to prior living arrangement once cath completed and angioplasty done if needed.    Entered: Reny Colón MD 11/21/2021, 2:32 AM        Reny Colón MD   PGY-2 internal medicine  Cards 2 nights  [facility]  Please see sign in/sign out for up to date coverage information  ______________________________________________________________________    Chief Complaint   Chest pain    History is obtained from the patient    History of Present Illness   Terese Gonzalez is a 61 year old female with no significant PMHx who presented to [facility] 11/19 due to left sided 7/10 chest pain that started at 1330 while she was driving and it radiated to the left arm, right jaw and back. Felt nauseated as well. Pain persisted for hours and when she presented to the ED she was found to have NSTEMI due to troponin 0.664 (which trended up to 16 then 20, then down to 18) and non specific T wave abnormalities in leads III, AVL and AVF. Echo showed distal posterolateral hypokinesis. Pain improved with 3x sublingual nitroglycerin and patient was started on heparin and nitroglycerin drip. She was also started on atorvastatin 40 mg and had initially been on  metoprolol and lisinopril but given the need to increase nitroglycerin drip they were held afterwards.  Patient electively transferred to [facility] for coronary angiogram +/- angioplasty. She reports 0 to 1/10 chest pain on arrival and denies SOB or history of similar incidents. She does report a recently history in February 2021 of sudden vision loss that resolved after 4-5 minutes and says she was worked up for it but work-up was negative. There is family history of CAD on her father's side, age >60.    Review of Systems    The 10 point Review of Systems is negative other than noted in the HPI.    Past Medical History    I have reviewed this patient's medical history and updated it with pertinent information if needed.   No past medical history on file.    Past Surgical History   I have reviewed this patient's surgical history and updated it with pertinent information if needed.  No past surgical history on file.    Social History   I have reviewed this patient's social history and updated it with pertinent information if needed.  Social History     Tobacco Use     Smoking status: Not on file     Smokeless tobacco: Not on file   Substance Use Topics     Alcohol use: Not on file     Drug use: Not on file     Family History   I have reviewed this patient's family history and updated it with pertinent information if needed.   CAD in family on father's side, none younger than 59 yo.    Prior to Admission Medications   Prior to Admission Medications   Prescriptions Last Dose Informant Patient Reported? Taking?   DULoxetine (CYMBALTA) 20 MG capsule   Yes No   Sig: Take 20 mg by mouth 2 times daily   acetaminophen (TYLENOL 8 HOUR) 650 MG CR tablet   No No   Sig: Take 1 tablet by mouth every 8 hours as needed for pain.   cetirizine (ZYRTEC) 10 MG tablet   Yes No   Sig: Take 10 mg by mouth daily   cyanocobalamin (VITAMIN B-12) 1000 MCG tablet   Yes No   Sig: Take 1,000 mcg by mouth daily   ibuprofen (ADVIL/MOTRIN) 200 MG tablet    Yes No   Sig: Take 400 mg by mouth every 6 hours as needed for mild pain      Facility-Administered Medications: None     Allergies   No Known Allergies    Physical Exam   Vital Signs: Temp: 97.8  F (36.6  C) Temp src: Oral BP: 120/75 Pulse: 77   Resp: 16 SpO2: 96 % O2 Device: None (Room air)    Weight: 194 lbs 9.6 oz    Constitutional: awake, alert, cooperative, no apparent distress, and appears stated age  Eyes: PERRL  Respiratory: good air exchange, clear to auscultation bilaterally, basilar wheezing heard  Cardiovascular: Normal apical impulse, regular rate and rhythm, normal S1 and S2, no S3 or S4, and no murmur noted, no MARK  GI: No scars, normal bowel sounds, soft, non-distended, non-tender  Skin: no bruising or bleeding  Musculoskeletal: Full range of motion noted. Tone is normal.  Neurologic: Awake, alert, oriented to name, place and time. Exam grossly nonfocal  Neuropsychiatric: calm and normal eye contact    Data   Data reviewed today: I reviewed all medications, new labs and imaging results over the last 24 hours. I personally reviewed Cardiology specific data review: the EKG tracing showing sinus bradycardia.     Recent Labs   Lab 11/20/21  2213 11/20/21  1237 11/20/21  0617 11/19/21  2342 11/19/21  1634   WBC 9.1  --  8.7  --  11.3*   HGB 12.8  --  12.5  --  14.1   MCV 90  --  92  --  91     --  288  --  293   NA  --   --  137  --  136   POTASSIUM  --   --  4.2  --  4.0   CHLORIDE  --   --  107  --  103   CO2  --   --  25  --  26   BUN  --   --  14  --  20   CR  --   --  0.76  --  0.74   ANIONGAP  --   --  5  --  7   BERNADETTE  --   --  8.7  --  9.2   GLC  --   --  108*  --  100*   TROPONIN  --  18.803* 20.386* 16.841* 0.664*

## 2021-11-21 NOTE — PROGRESS NOTES
Patient arrived to unit 6C room 419-1 from [facility]. Verified belongings with patient:    Cell phone with   [employer] tote bag  Pair of slippers  Clothing     Belongings kept at bedside per patient request.

## 2021-11-21 NOTE — PLAN OF CARE
Patient transferred to , . Hortensia CORONA RN FP gave report at 1530. Patient reported no pain, VS stable.

## 2021-11-21 NOTE — PROGRESS NOTES
"Reason for Admission: NSTEMI  Admitted from: [facility]  Via: Stretcher  Accompanied by: EMS  Belongings: kept at bedside  Family: Spouse aware of transfer and at bedside  Admission profile: complete  Teaching: orientation to unit and call light- call light within reach, call don't fall, use of console, meal times, when to call for the RN, and enforced importance of safety   Access: Right PIV x2 infusing Heparin at 1250 units/hr and Nitroglycerin at 40mcg/min  Telemetry: placed  Height/weight: complete  Two RN skin assessment completed by: Zach WHITE RN and Noemi VALLADARES RN. Black eye noted under right eye. Per patient, black eye is from a facial. No other skin issues identified.     Patient Status: Endorsing headache r/t Nitroglycerin and left sided chest tightness  /83 (BP Location: Left arm)   Pulse 70   Temp 98  F (36.7  C) (Oral)   Resp 16   Ht 1.727 m (5' 8\")   Wt 88.3 kg (194 lb 9.6 oz)   SpO2 97%   BMI 29.59 kg/m        "

## 2021-11-22 VITALS
SYSTOLIC BLOOD PRESSURE: 109 MMHG | TEMPERATURE: 98.3 F | RESPIRATION RATE: 18 BRPM | DIASTOLIC BLOOD PRESSURE: 72 MMHG | HEIGHT: 68 IN | WEIGHT: 193.4 LBS | OXYGEN SATURATION: 96 % | HEART RATE: 72 BPM | BODY MASS INDEX: 29.31 KG/M2

## 2021-11-22 LAB
ATRIAL RATE - MUSE: 60 BPM
ATRIAL RATE - MUSE: 69 BPM
ATRIAL RATE - MUSE: 79 BPM
DIASTOLIC BLOOD PRESSURE - MUSE: NORMAL MMHG
INTERPRETATION ECG - MUSE: NORMAL
P AXIS - MUSE: 28 DEGREES
P AXIS - MUSE: 52 DEGREES
P AXIS - MUSE: NORMAL DEGREES
PR INTERVAL - MUSE: 138 MS
PR INTERVAL - MUSE: 140 MS
PR INTERVAL - MUSE: 144 MS
QRS DURATION - MUSE: 82 MS
QT - MUSE: 376 MS
QT - MUSE: 386 MS
QT - MUSE: 424 MS
QTC - MUSE: 413 MS
QTC - MUSE: 424 MS
QTC - MUSE: 431 MS
R AXIS - MUSE: -15 DEGREES
R AXIS - MUSE: -9 DEGREES
R AXIS - MUSE: 8 DEGREES
SYSTOLIC BLOOD PRESSURE - MUSE: NORMAL MMHG
T AXIS - MUSE: 34 DEGREES
T AXIS - MUSE: 36 DEGREES
T AXIS - MUSE: 64 DEGREES
UFH PPP CHRO-ACNC: <0.1 IU/ML
VENTRICULAR RATE- MUSE: 60 BPM
VENTRICULAR RATE- MUSE: 69 BPM
VENTRICULAR RATE- MUSE: 79 BPM

## 2021-11-22 PROCEDURE — 250N000013 HC RX MED GY IP 250 OP 250 PS 637: Performed by: STUDENT IN AN ORGANIZED HEALTH CARE EDUCATION/TRAINING PROGRAM

## 2021-11-22 PROCEDURE — 85520 HEPARIN ASSAY: CPT | Performed by: INTERNAL MEDICINE

## 2021-11-22 PROCEDURE — 99239 HOSP IP/OBS DSCHRG MGMT >30: CPT | Mod: 25 | Performed by: NURSE PRACTITIONER

## 2021-11-22 PROCEDURE — 36415 COLL VENOUS BLD VENIPUNCTURE: CPT | Performed by: INTERNAL MEDICINE

## 2021-11-22 PROCEDURE — 250N000013 HC RX MED GY IP 250 OP 250 PS 637: Performed by: NURSE PRACTITIONER

## 2021-11-22 RX ORDER — ISOSORBIDE MONONITRATE 30 MG/1
30 TABLET, EXTENDED RELEASE ORAL DAILY
Qty: 90 TABLET | Refills: 3 | Status: SHIPPED | OUTPATIENT
Start: 2021-11-22 | End: 2022-01-03

## 2021-11-22 RX ORDER — ATORVASTATIN CALCIUM 40 MG/1
40 TABLET, FILM COATED ORAL DAILY
Qty: 90 TABLET | Refills: 3 | Status: SHIPPED | OUTPATIENT
Start: 2021-11-22 | End: 2023-01-05

## 2021-11-22 RX ORDER — ASPIRIN 81 MG/1
81 TABLET ORAL DAILY
Status: DISCONTINUED | OUTPATIENT
Start: 2021-11-22 | End: 2021-11-22 | Stop reason: HOSPADM

## 2021-11-22 RX ADMIN — CETIRIZINE HYDROCHLORIDE 10 MG: 10 TABLET, FILM COATED ORAL at 07:53

## 2021-11-22 RX ADMIN — Medication 1000 MCG: at 07:53

## 2021-11-22 RX ADMIN — ISOSORBIDE MONONITRATE 30 MG: 30 TABLET, EXTENDED RELEASE ORAL at 07:53

## 2021-11-22 RX ADMIN — ACETAMINOPHEN 975 MG: 325 TABLET, FILM COATED ORAL at 00:09

## 2021-11-22 RX ADMIN — ASPIRIN 81 MG: 81 TABLET, COATED ORAL at 08:01

## 2021-11-22 RX ADMIN — DULOXETINE HYDROCHLORIDE 20 MG: 20 CAPSULE, DELAYED RELEASE ORAL at 07:53

## 2021-11-22 ASSESSMENT — ACTIVITIES OF DAILY LIVING (ADL)
ADLS_ACUITY_SCORE: 4
ADLS_ACUITY_SCORE: 6
ADLS_ACUITY_SCORE: 4
ADLS_ACUITY_SCORE: 6
ADLS_ACUITY_SCORE: 6
ADLS_ACUITY_SCORE: 4
ADLS_ACUITY_SCORE: 6
ADLS_ACUITY_SCORE: 6

## 2021-11-22 ASSESSMENT — MIFFLIN-ST. JEOR: SCORE: 1490.76

## 2021-11-22 NOTE — DISCHARGE SUMMARY
[address] [address]  p: [phone]    Discharge Summary: Cardiology Service    Terese Gonzalez MRN# [id number]   YOB: 1960 Age: 61 year old       Admission Date: 11/20/2021  Discharge Date: 11/22/2021    Discharge Diagnoses:  # BLANCA     Brief HPI:  Terese Gonzalez is a 61 year old female with no significant PMHx who is admitted for NSTEMI, found to have clean coronary arteries, likely MINOCA    Hospital Course by Diagnosis:  # BLANCA   Patient presented to ED with c/o chest pain that radiated down left arm, right jaw, and back. Troponin 0.6, non-specific ST changes in II, AVL, AVF. Started on hep gtt/nitro gtt. Echo with posterolateral hypokinesis. While on floor, pt had worsened chest pain, EKG with Martell in III, AVF, troponin rise 16. Taken emergently to cath lab. Coronary angiogram with clean cors. Patient currently chest pain free, vital signs stable    - 81 mg ASA  - Lipitor 40 mg daily  - Imdur 30 mg daily  - cMRI as OP  - Follow up with cardiology 3-6 weeks    Pertinent Procedures:  1. Coronary Angiogram 11/22/2021    Medication Changes:  START 81 mg ASA daily  START Lipitor 40 mg daily  START Imdur 30 mg daily    Discharge medications:   Current Discharge Medication List      START taking these medications    Details   aspirin (ASA) 81 MG EC tablet Take 1 tablet (81 mg) by mouth daily  Qty: 90 tablet, Refills: 3    Associated Diagnoses: NSTEMI (non-ST elevated myocardial infarction) (H)      atorvastatin (LIPITOR) 40 MG tablet Take 1 tablet (40 mg) by mouth daily  Qty: 90 tablet, Refills: 3    Associated Diagnoses: NSTEMI (non-ST elevated myocardial infarction) (H)      isosorbide mononitrate (IMDUR) 30 MG 24 hr tablet Take 1 tablet (30 mg) by mouth daily  Qty: 90 tablet, Refills: 3    Associated Diagnoses: NSTEMI (non-ST elevated myocardial infarction) (H)         CONTINUE these medications which have NOT CHANGED     Details   acetaminophen (TYLENOL 8 HOUR) 650 MG CR tablet Take 1 tablet by mouth every 8 hours as needed for pain.  Qty: 100 tablet, Refills: 11    Associated Diagnoses: Concussion      cetirizine (ZYRTEC) 10 MG tablet Take 10 mg by mouth daily      cyanocobalamin (VITAMIN B-12) 1000 MCG tablet Take 1,000 mcg by mouth daily      DULoxetine (CYMBALTA) 20 MG capsule Take 20 mg by mouth 2 times daily         STOP taking these medications       ibuprofen (ADVIL/MOTRIN) 200 MG tablet Comments:   Reason for Stopping:               Follow-up:  PCP 7-10 days post hospitalization visit  Cardiology CYNDEE 3-6 weeks, BLANCA follow up, review MRI    Labs or imaging requiring follow-up after discharge:  Cardiac MRI    Code status:  Full    Condition on discharge  Temp:  [97.6  F (36.4  C)-98.8  F (37.1  C)] 98.3  F (36.8  C)  Pulse:  [61-83] 78  Resp:  [15-20] 16  BP: ()/(60-79) 112/69  Cuff Mean (mmHg):  [82] 82  SpO2:  [92 %-98 %] 94 %  General: Alert, interactive, NAD  Eyes: sclera anicteric, EOMI  Neck: no JVD, carotid 2+ bilaterally  Cardiovascular: regular rate and rhythm, normal S1 and S2, no murmurs, gallops, or rubs  Resp: clear to auscultation bilaterally, no rales, wheezes, or rhonchi  GI: Soft, nontender, nondistended. +BS.  No HSM or masses, no rebound or guarding.  Extremities: no edema, no cyanosis or clubbing, dorsalis pedis and posterior tibialis pulses 2+ bilaterally  Skin: Warm and dry, no jaundice or rash; RRA CDI, soft, non-tender to touch, minimal bruising around site, non-tender to touch, no swelling, no signs of hematoma  Neuro: CN 2-12 intact, moves all extremities equally  Psych: Alert & oriented x 3    Imaging with results:  Echocardiogram 11/20/2021:  Interpretation Summary     The visual ejection fraction is estimated at 55%.  distal posterolateral hypokinesis  The right ventricle is normal in structure, function and size.  There is trace to mild tricuspid regurgitation.  The patient exhibited  occasional PVCs.  There is no comparison study available.    Coronary Angiogram 11/22/2021:  Indications  NSTEMI (non-ST elevated myocardial infarction) (H) [I21.4 (ICD-10-CM)]     Comments/Patient Narrative  61F with no significant past medical history who presented with chest pain, dynamic ST changes, troponin elevation who is referred to cath lab for coronary angiogram with possible intervention.     Pre Procedure Diagnosis  ACS > 24 hours         Conclusion    Left ventricular filling pressures are normal.    Hemodynamic data has been modified in [facility] per physician review.  MINOCA        Plan    Follow bedrest per protocol    Continued medical management and lifestyle modifications for cardiovascular risk factor optimizations.    Arterial sheath removed from radial artery with TR band placement.    Cardiac rehabilitation.    Return to the primary inpatient team for further evaluation and managmenet    Start Imdur and wean nitroglycerin drip.  Cardiac MRI as outpatient.  Discharge tomorrow if pain free on PO nitrates. PRN SL nitroglycerin.     Coronary Findings  DiagnosticDominance: Right    Left Main   The vessel was visualized by selective angiography, is moderate in size and is angiographically normal. There was 0% vessel disease.   Left Anterior Descending   The vessel was visualized by selective angiography, is moderate in size and is angiographically normal. There was 0% vessel disease.   First Diagonal Branch   The vessel is small and is angiographically normal.   Second Diagonal Branch   The vessel is moderate in size and is angiographically normal.   Left Circumflex   The vessel was visualized by selective angiography and is moderate in size. There was 0% vessel disease.   Prox Cx lesion is 25% stenosed.   First Obtuse Marginal Branch   The vessel is moderate in size and is angiographically normal.   Second Obtuse Marginal Branch   The vessel is large and is angiographically normal.   Right Coronary  Artery   The vessel was visualized by selective angiography, is moderate in size and is angiographically normal. There was 0% vessel disease.   Right Posterior Descending Artery   The vessel is moderate in size and is angiographically normal.   Right Posterior Atrioventricular Artery   The vessel is small and is angiographically normal.   First Right Posterolateral Branch   The vessel is small and is angiographically normal.   Second Right Posterolateral Branch   The vessel is small and is angiographically normal.       Intervention  No interventions have been documented.    Hemodynamics  LVEDP 12 mmHg  No significant gradient on LV-Ao pullback Left ventricular filling pressures are normal. Hemodynamic data has been modified in Epic per physician review.       Other imaging studies:  EKG 12 Lead 11/20/2021: SB HR 57      Patient Care Team:  System, Provider Not In as PCP - General (Clinic)    45 minutes spent in discharge, including >50% in counseling and coordination of care, medication review and plan of care recommended on follow up. Questions were answered.   It was our pleasure to care for Terese during this hospitalization. Please do not hesitate to contact me should there be questions regarding the hospital course or discharge plan.      Milly WILLSON, CNP  [facility] Cardiology  [phone]

## 2021-11-22 NOTE — PROGRESS NOTES
Patient admitted NSTEMI,  and elevated troponin. 16>20>18.  PMH of fibromyalgia     Today care 11/21/21  Pt back from Angiogram at 1324ish. VSS.   Clean cors- to be discharge tomorrow.   Right arm TR band re-inflated to 10cc due to bleeding when it deflated down to 7cc -maintain good pulses, stable vital signs. CSI updated and ok to deflat TR bank slowly. TR band down to Zero at 1924 - to be remove soon by night nurse.   *Tylenol given for headache with some relief. Reminded patient drink enough water to prevent dehydration.   * Aromatherapy essential oil for ache and lavender stick given for headache- pt very please.       Neuro: A&O x4, denied palpitations, difficulty breathing, SOB, dizziness, and nausea.   Respiratory:  LS clear O2 sating >96% on RA.   Cardiac: Tele shows SR. SBP 100s. MAP >65. Denies chest pain, dizziness, and SOB.   GI: Denied nausea, bowel sounds audible.  Tolerates regular diet.   : Had adequate urine output, see I&O flowsheet. Pt voided in bathroom since came back from CCL.   Skin: Right TR band on wrist. No hematoma and no bruises. CMS intact.   Pain: Tylenol given for headache-headache relief from 5 to 3. Pt able to sleep after tylenol given.   Tests/procedures: Angiogram performed during shift, see Chart Review for results.    Mobility: Ambulated to bathroom with SBA.  Social:  visited during shift.    Plan:  Expected discharge early in AM.  Continue to monitor pain/headache, VS, heart rhythm, fluid status, bowel status, cardiac and respiratory status.  Notify care team of changes in patient condition or other concerns.

## 2021-11-22 NOTE — PLAN OF CARE
VSS. RA. Right radial site WNL. Discharge instructions discussed with patient, pt verbalizes understanding. Pt's  providing transportation home. PIV out. Pt denies pain.

## 2021-11-22 NOTE — DISCHARGE INSTRUCTIONS
Going Home after an Angiogram  ______________________________________________      After you go home:    Have an adult stay with you for 24 hours.    Drink plenty of fluids.    You may eat your normal diet, unless your doctor tells you otherwise.    For 24 hours:    Relax and take it easy.    Do NOT smoke.    Do NOT make any important or legal decisions.    Do NOT drive or operate machines at home or at work.    Do NOT drink alcohol.    Remove the Band-Aid after 24 hours. If there is minor oozing, apply another Band-aid and remove it after 12 hours.    For 2 days, do NOT have sex or do any heavy exercise.    Do NOT take a bath, or use a hot tub or pool for at least 3 days. You may shower.      Care of wrist or arm site  It is normal to have soreness at the puncture site and mild tingling in your hand for up to 3 days.    For 2 days, do not use your hand or arm to support your weight (such as rising from a chair) or bend your wrist (such as lifting a garage door).    For 2 days, do not lift more than 5 pounds or exercise your arm (tennis, golf or bowling).    If you start bleeding from the site in your arm:    Sit down and press firmly on the site with your fingers for 10 minutes. Call your doctor as soon as you can.    If the bleeding stops, sit still and keep your wrist straight for 2 hours.    Medicines    If you have started taking Plavix or Effient, do not stop taking it until you talk to your heart doctor (cardiologist).    If you are on metformin (Glucophage), do not restart it until you have blood tests (within 2 to 3 days after discharge). When your doctor tells you it is safe, you may restart the metformin.    If you have stopped any other medicines, check with your nurse or provider about when to restart them.    Call 911 right away if you have bleeding that is heavy or does not stop.    Call your doctor if:    You have a large or growing hard lump around the site.    The site is red, swollen, hot or  tender.    Blood or fluid is draining from the site.    You have chills or a fever greater than 101 F (38 C).    Your leg or arm feels numb or cool.    You have hives, a rash or unusual itching.      [facility] [place]:  [phone] (7 days a week)

## 2021-11-22 NOTE — PROGRESS NOTES
Pt is alert and oriented x 4.Denies chest pain this shift. Complained of mild headache, tylenol given with good effect. Up and about with SBA.  SR on the monitor. BP stable. Afebrile. R radial post cath site with intact CMS. Transparent dressing in place. On room air, O2 sat >92%.Occasionally desaturates briefly to the mid 80's when sound asleep and snoring.Voids without difficulty with adequate urine output.

## 2021-11-29 ENCOUNTER — TELEPHONE (OUTPATIENT)
Dept: CARDIOLOGY | Facility: CLINIC | Age: 61
End: 2021-11-29
Payer: COMMERCIAL

## 2021-11-29 NOTE — TELEPHONE ENCOUNTER
[facility] Call Center    Phone Message    May a detailed message be left on voicemail: yes     Reason for Call: Other: Terese called wanting to know if it is okay for her to fly to [place] on Friday 12/03/21. Terese stated that she had a heart attack on 11/19/21 and wants to make sure she is clear to fly. Please advise. Thank you.     Action Taken: Message routed to:  [facility] ([facility]): Cardio    Travel Screening: Not Applicable

## 2021-11-29 NOTE — TELEPHONE ENCOUNTER
"S-(situation): patient had angiogram on November 2. No obstructions noted. She was discharged with long acting nitrate and cMR ordered to evaluate for MINOCA. She has been doing well on the nitrates. Has noticed some tightness while walking one night but it resolved with rest. She would like to fly to [place] this weekend.  She states that she has noticed that the Lipitor seems to be causing some \"electrical tingling\" in her feet when she takes her Cymbalta.    B-(background): 61F with no significant past medical history who presented with chest pain, dynamic ST changes, troponin elevation who is referred to cath lab for coronary angiogram with possible intervention.    A-(assessment): non obstructive CAD    R-(recommendations): patient can fly to [place], instructed to take meds with her on flight. Made a follow up appointment to discuss medications and cMR results.     "

## 2021-12-14 ENCOUNTER — HOSPITAL ENCOUNTER (OUTPATIENT)
Dept: MRI IMAGING | Facility: CLINIC | Age: 61
Discharge: HOME | End: 2021-12-14
Attending: NURSE PRACTITIONER | Admitting: NURSE PRACTITIONER
Payer: COMMERCIAL

## 2021-12-14 DIAGNOSIS — I21.4 NSTEMI (NON-ST ELEVATED MYOCARDIAL INFARCTION) (H): ICD-10-CM

## 2021-12-14 PROCEDURE — 255N000002 HC RX 255 OP 636: Performed by: NURSE PRACTITIONER

## 2021-12-14 PROCEDURE — 75561 CARDIAC MRI FOR MORPH W/DYE: CPT | Mod: 26 | Performed by: STUDENT IN AN ORGANIZED HEALTH CARE EDUCATION/TRAINING PROGRAM

## 2021-12-14 PROCEDURE — A9585 GADOBUTROL INJECTION: HCPCS | Performed by: NURSE PRACTITIONER

## 2021-12-14 PROCEDURE — 75561 CARDIAC MRI FOR MORPH W/DYE: CPT

## 2021-12-14 RX ORDER — GADOBUTROL 604.72 MG/ML
10 INJECTION INTRAVENOUS ONCE
Status: COMPLETED | OUTPATIENT
Start: 2021-12-14 | End: 2021-12-14

## 2021-12-14 RX ADMIN — GADOBUTROL 10 ML: 604.72 INJECTION INTRAVENOUS at 08:50

## 2021-12-18 ENCOUNTER — HEALTH MAINTENANCE LETTER (OUTPATIENT)
Age: 61
End: 2021-12-18

## 2022-01-01 NOTE — PROGRESS NOTES
Cardiology Clinic Note  January 3, 2022      HPI:  Terese Gonzalez is a 61 year old who presents for NSTEMI follow-up.     Her risk factor profile is: -HTN, -HLD, -DM, - tobacco use, + family of CAD (father, paternal cousin, paternal grandfather 70s)    Patient was admitted to [facility] on 11/19 with acute left sided chest pain with radiation to her left arm and jaw. She was found to have elevated troponin 0.664->16->20. Her echo showed normal EF with distal posterolateral hypokinesis. She was started on heparin and transferred to [facility] for coronary angiogram. Upon arrival to [facility] she reported worsening chest pain, EKG showed ST elevation in leads III and aVF. She underwent emergent cath which showed normal coronary arteries. She was discharged on goal directed medical therapy. Outpatient CMR shows ischemic CMY with LCx infarct EF 49%.    She reports feeling fatigued but says it gets a little better every day. She did have recurrent angina with exercise when she was first discharged, this has dissipated overtime. She was recently hiking in [place] and has been averaging 6752-4557 steps a day without exertional chest pain. She does report brief episodes of left lateral chest pain lasting seconds before resolving spontaneously, this is non exertional and most noticeable with laying in bed. She otherwise denies SOB, orthopnea, PND, leg swelling, weight gain, lightheadedness, palpitations or syncope. She is tolerating her medications but does experience flushing 30 minutes after taking Imdur.    Past medical history:  NSTEMI  Non obstructive CAD  Fibromyalgia    Medications:  acetaminophen (TYLENOL 8 HOUR) 650 MG CR tablet, Take 1 tablet by mouth every 8 hours as needed for pain.  aspirin (ASA) 81 MG EC tablet, Take 1 tablet (81 mg) by mouth daily  atorvastatin (LIPITOR) 40 MG tablet, Take 1 tablet (40 mg) by mouth daily  cetirizine (ZYRTEC) 10 MG tablet, Take 10 mg by mouth daily  cyanocobalamin (VITAMIN B-12) 1000 MCG  tablet, Take 1,000 mcg by mouth daily  DULoxetine (CYMBALTA) 20 MG capsule, Take 20 mg by mouth 2 times daily  isosorbide mononitrate (IMDUR) 30 MG 24 hr tablet, Take 1 tablet (30 mg) by mouth daily    No current facility-administered medications on file prior to visit.    Allergies:    No Known Allergies    Family and social history:    No family history on file.    Social History     Socioeconomic History     Marital status:      Spouse name: Not on file     Number of children: Not on file     Years of education: Not on file     Highest education level: Not on file   Occupational History     Not on file   Tobacco Use     Smoking status: Not on file     Smokeless tobacco: Not on file   Substance and Sexual Activity     Alcohol use: Not on file     Drug use: Not on file     Sexual activity: Not on file   Other Topics Concern     Not on file   Social History Narrative     Not on file     Social Determinants of Health     Financial Resource Strain: Not on file   Food Insecurity: Not on file   Transportation Needs: Not on file   Physical Activity: Not on file   Stress: Not on file   Social Connections: Not on file   Intimate Partner Violence: Not on file   Housing Stability: Not on file     Review of Systems:  Skin: No skin rash or ulcers.  Respiratory: No cough or hemoptysis.  Cardiovascular: See HPI.    Gastrointestinal: No abdominal pain, nausea, vomiting, hematemesis or melena.  Genitourinary: No increased frequency or urgency of urine. No dysuria or hematuria.  Musculoskeletal: No polyarthralgia or myalgias.  Neurologic: No headaches, seizure or focal weakness.  Hematologic/Lymphatic/Immunologic: No bleeding tendency.    Vital signs:  /82 (BP Location: Right arm, Patient Position: Chair, Cuff Size: Adult Regular)   Pulse 82   Wt 92.2 kg (203 lb 4.8 oz)   SpO2 95%   BMI 30.91 kg/m     Wt Readings from Last 2 Encounters:   11/22/21 87.7 kg (193 lb 6.4 oz)   11/19/21 90.5 kg (199 lb 9.6 oz)      Physical Exam:  Gen: NAD.    HEENT: No conjunctival pallor or scleral icterus, MMM. Clear oropharynx.    Neck: No JVD. No thyroid enlargement or cervical adenopathy.    Chest: Clear to auscultation bilaterally.    CV: Normal first and second heart sounds. No murmurs or gallop appreciated.    Abdomen: Soft, non-tender, non-distended, BS+.  Ext: No edema. Warm and well perfused with normal capillary refill.    Skin: No skin rash or ulcers.  Neuro: alert, oriented and appropriately conversant.    Psych: Normal affect and speech.    Labs:  Last Basic Metabolic Panel:  Lab Results   Component Value Date     11/21/2021     10/26/2014      Lab Results   Component Value Date    POTASSIUM 4.4 11/21/2021    POTASSIUM 4.0 10/26/2014     Lab Results   Component Value Date    CHLORIDE 105 11/21/2021    CHLORIDE 108 10/26/2014     Lab Results   Component Value Date    BERNADETTE 9.2 11/21/2021    BERNADETTE 9.4 10/26/2014     Lab Results   Component Value Date    CO2 24 11/21/2021    CO2 26 10/26/2014     Lab Results   Component Value Date    BUN 13 11/21/2021    BUN 18 10/26/2014     Lab Results   Component Value Date    CR 0.75 11/21/2021    CR 0.88 10/26/2014     Lab Results   Component Value Date     11/21/2021    GLC 88 10/26/2014       Lab Results   Component Value Date    WBC 9.7 11/21/2021    WBC 8.1 10/26/2014     Lab Results   Component Value Date    RBC 4.47 11/21/2021    RBC 4.66 10/26/2014     Lab Results   Component Value Date    HGB 12.9 11/21/2021    HGB 13.8 10/26/2014     Lab Results   Component Value Date    HCT 40.5 11/21/2021    HCT 41.8 10/26/2014     Lab Results   Component Value Date    MCV 91 11/21/2021    MCV 90 10/26/2014     Lab Results   Component Value Date    MCH 28.9 11/21/2021    MCH 29.6 10/26/2014     Lab Results   Component Value Date    MCHC 31.9 11/21/2021    MCHC 33.0 10/26/2014     Lab Results   Component Value Date    RDW 13.3 11/21/2021    RDW 13.8 10/26/2014     Lab Results    Component Value Date     11/21/2021     10/26/2014       No results found for: INR      Diagnostics:    CMR 12/14/21:     Clinical history: Patient with hx of acute coronary syndrome and non obstructive disease on coronary  angiogram.  Comparison CMR: n/a      1. The LV is normal in cavity size with wall thinning in the apical lateral segment. The global systolic  function is mildly reduced. The LVEF is 49%. There is akinesis of the apical lateral and mid inferolateral  segments.      2. The RV is normal in cavity size. The global systolic function is normal. The RVEF is 64%.      3. Both atria are normal in size.     4. There is no significant valvular disease.      5. Late gadolinium enhancement imaging shows hyperenhancement in the mid infeorlateral and apical lateral  segments. These findings are consistent with a prior myocardial infarction in the left circumflex  territory.      6. There is no pericardial effusion or thickening.     7. There is no intracardiac thrombus.     CONCLUSIONS: Ischemic cardiomyopathy with mildly reduced LV function, LVEF of 49% and evidence of prior  myocardial infarction in the LCx territory, MINOCA. Normal RV function, RVEF of 64%.     Coronary angiogram 11/21/21:    61F with no significant past medical history who presented with chest pain, dynamic ST changes, troponin elevation who is referred to cath lab for coronary angiogram with possible intervention.       Pre Procedure Diagnosis    ACS > 24 hours         Conclusion         Left ventricular filling pressures are normal.    Hemodynamic data has been modified in [facility] per physician review.     MINOCA              Plan      Follow bedrest per protocol    Continued medical management and lifestyle modifications for cardiovascular risk factor optimizations.    Arterial sheath removed from radial artery with TR band placement.    Cardiac rehabilitation.    Return to the primary inpatient team for further evaluation  and managmenet      Start Imdur and wean nitroglycerin drip.  Cardiac MRI as outpatient.  Discharge tomorrow if pain free on PO nitrates. PRN SL nitroglycerin.       Assessment and Plan:  This is a 61 year old with risk factor profile: -HTN, -HLD, -DM, - tobacco use, + family of CA who presents for NSTEMI follow-up.    #MINOCA  ##Ischemia CMY EF 49:  Patient was recently admitted to [facility] with an NSTEMI, found to have normal coronary arteries, CMR confirms LCx territory MI. Suspect coronary vasospasm. She does report some atypical chest pain but no exertional angina.   - Continue ASA lifelong   - Start Plavix 75 mg daily and continue for 12 months if no bleeding concerns   - Continue atorvastatin 40 mg daily  - Increase Imdur to 60 mg daily  - Cardiac Rehab [facility]     Follow-up: RTC in 1 month prior to leaving for [place].    Answers for HPI/ROS submitted by the patient on 1/2/2022  General Symptoms: Yes  Skin Symptoms: No  HENT Symptoms: No  EYE SYMPTOMS: No  HEART SYMPTOMS: Yes  LUNG SYMPTOMS: Yes  INTESTINAL SYMPTOMS: No  URINARY SYMPTOMS: No  GYNECOLOGIC SYMPTOMS: No  BREAST SYMPTOMS: No  SKELETAL SYMPTOMS: Yes  BLOOD SYMPTOMS: No  NERVOUS SYSTEM SYMPTOMS: Yes  MENTAL HEALTH SYMPTOMS: No  Fever: No  Loss of appetite: No  Weight loss: No  Weight gain: Yes  Fatigue: Yes  Night sweats: No  Chills: No  Increased stress: No  Excessive hunger: No  Excessive thirst: No  Feeling hot or cold when others believe the temperature is normal: No  Loss of height: No  Post-operative complications: No  Surgical site pain: No  Hallucinations: No  Change in or Loss of Energy: Yes  Hyperactivity: No  Confusion: No  Chest pain or pressure: Yes  Fast or irregular heartbeat: Yes  Pain in legs with walking: Yes  Trouble breathing while lying down: No  Fingers or toes appear blue: No  High blood pressure: No  Fainting: No  Murmurs: No  Pacemaker: No  Varicose veins: No  Edema or swelling: No  Wake up at night with shortness of  breath: No  Light-headedness: Yes  Exercise intolerance: No  Cough: Yes  Sputum or phlegm: No  Coughing up blood: No  Difficulty breating or shortness of breath: No  Snoring: Yes  Wheezing: No  Difficulty breathing on exertion: Yes  Nighttime Cough: Yes  Difficulty breathing when lying flat: No  Back pain: No  Muscle aches: Yes  Neck pain: No  Swollen joints: Yes  Joint pain: Yes  Bone pain: No  Muscle cramps: No  Muscle weakness: No  Joint stiffness: Yes  Bone fracture: No  Trouble with coordination: No  Dizziness or trouble with balance: Yes  Fainting or black-out spells: No  Memory loss: No  Headache: Yes  Seizures: No  Speech problems: No  Tingling: Yes  Tremor: No  Weakness: No  Difficulty walking: No  Paralysis: No  Numbness: No

## 2022-01-02 ASSESSMENT — ENCOUNTER SYMPTOMS
MUSCLE WEAKNESS: 0
HYPERTENSION: 0
DIZZINESS: 1
COUGH: 1
ALTERED TEMPERATURE REGULATION: 0
MYALGIAS: 1
DYSPNEA ON EXERTION: 1
BACK PAIN: 0
LEG PAIN: 1
WEIGHT GAIN: 1
WHEEZING: 0
INCREASED ENERGY: 1
POLYDIPSIA: 0
LIGHT-HEADEDNESS: 1
FATIGUE: 1
SNORES LOUDLY: 1
POSTURAL DYSPNEA: 0
POLYPHAGIA: 0
SPUTUM PRODUCTION: 0
EXERCISE INTOLERANCE: 0
ARTHRALGIAS: 1
DECREASED APPETITE: 0
SYNCOPE: 0
SPEECH CHANGE: 0
TREMORS: 0
COUGH DISTURBING SLEEP: 1
MUSCLE CRAMPS: 0
DISTURBANCES IN COORDINATION: 0
SLEEP DISTURBANCES DUE TO BREATHING: 0
HEADACHES: 1
NECK PAIN: 0
FEVER: 0
HEMOPTYSIS: 0
SEIZURES: 0
WEIGHT LOSS: 0
WEAKNESS: 0
PALPITATIONS: 1
JOINT SWELLING: 1
LOSS OF CONSCIOUSNESS: 0
ORTHOPNEA: 0
NIGHT SWEATS: 0
TINGLING: 1
PARALYSIS: 0
STIFFNESS: 1
CHILLS: 0
HALLUCINATIONS: 0
NUMBNESS: 0
MEMORY LOSS: 0
SHORTNESS OF BREATH: 0

## 2022-01-03 ENCOUNTER — OFFICE VISIT (OUTPATIENT)
Dept: CARDIOLOGY | Facility: CLINIC | Age: 62
End: 2022-01-03
Attending: NURSE PRACTITIONER
Payer: COMMERCIAL

## 2022-01-03 VITALS
WEIGHT: 203.3 LBS | DIASTOLIC BLOOD PRESSURE: 82 MMHG | OXYGEN SATURATION: 95 % | BODY MASS INDEX: 30.91 KG/M2 | SYSTOLIC BLOOD PRESSURE: 117 MMHG | HEART RATE: 82 BPM

## 2022-01-03 DIAGNOSIS — I21.4 NSTEMI (NON-ST ELEVATED MYOCARDIAL INFARCTION) (H): ICD-10-CM

## 2022-01-03 DIAGNOSIS — I21.4 NSTEMI (NON-ST ELEVATED MYOCARDIAL INFARCTION) (H): Primary | ICD-10-CM

## 2022-01-03 PROCEDURE — G0463 HOSPITAL OUTPT CLINIC VISIT: HCPCS

## 2022-01-03 PROCEDURE — 99214 OFFICE O/P EST MOD 30 MIN: CPT | Performed by: NURSE PRACTITIONER

## 2022-01-03 RX ORDER — ISOSORBIDE MONONITRATE 60 MG/1
60 TABLET, EXTENDED RELEASE ORAL DAILY
Qty: 30 TABLET | Refills: 0 | Status: SHIPPED | OUTPATIENT
Start: 2022-01-03 | End: 2022-01-20

## 2022-01-03 ASSESSMENT — PAIN SCALES - GENERAL: PAINLEVEL: NO PAIN (0)

## 2022-01-03 NOTE — LETTER
1/3/2022      RE: Terese Gonzalez  [address]  [place] [facility] [place] [facility] [id number]       Dear Colleague,    Thank you for the opportunity to participate in the care of your patient, Terese Gonzalez, at the [facility] [place] [facility]. Please see a copy of my visit note below.    Cardiology Clinic Note  January 3, 2022      HPI:  Terese Gonzalez is a 61 year old who presents for NSTEMI follow-up.     Her risk factor profile is: -HTN, -HLD, -DM, - tobacco use, + family of CAD (father, paternal cousin, paternal grandfather 70s)    Patient was admitted to [facility] on 11/19 with acute left sided chest pain with radiation to her left arm and jaw. She was found to have elevated troponin 0.664->16->20. Her echo showed normal EF with distal posterolateral hypokinesis. She was started on heparin and transferred to [facility] for coronary angiogram. Upon arrival to [facility] she reported worsening chest pain, EKG showed ST elevation in leads III and aVF. She underwent emergent cath which showed normal coronary arteries. She was discharged on goal directed medical therapy. Outpatient CMR shows ischemic CMY with LCx infarct EF 49%.    She reports feeling fatigued but says it gets a little better every day. She did have recurrent angina with exercise when she was first discharged, this has dissipated overtime. She was recently hiking in [place] and has been averaging 4700-6336 steps a day without exertional chest pain. She does report brief episodes of left lateral chest pain lasting seconds before resolving spontaneously, this is non exertional and most noticeable with laying in bed. She otherwise denies SOB, orthopnea, PND, leg swelling, weight gain, lightheadedness, palpitations or syncope. She is tolerating her medications but does experience flushing 30 minutes after taking Imdur.    Past medical history:  NSTEMI  Non obstructive  CAD  Fibromyalgia    Medications:  acetaminophen (TYLENOL 8 HOUR) 650 MG CR tablet, Take 1 tablet by mouth every 8 hours as needed for pain.  aspirin (ASA) 81 MG EC tablet, Take 1 tablet (81 mg) by mouth daily  atorvastatin (LIPITOR) 40 MG tablet, Take 1 tablet (40 mg) by mouth daily  cetirizine (ZYRTEC) 10 MG tablet, Take 10 mg by mouth daily  cyanocobalamin (VITAMIN B-12) 1000 MCG tablet, Take 1,000 mcg by mouth daily  DULoxetine (CYMBALTA) 20 MG capsule, Take 20 mg by mouth 2 times daily  isosorbide mononitrate (IMDUR) 30 MG 24 hr tablet, Take 1 tablet (30 mg) by mouth daily    No current facility-administered medications on file prior to visit.    Allergies:    No Known Allergies    Family and social history:    No family history on file.    Social History     Socioeconomic History     Marital status:      Spouse name: Not on file     Number of children: Not on file     Years of education: Not on file     Highest education level: Not on file   Occupational History     Not on file   Tobacco Use     Smoking status: Not on file     Smokeless tobacco: Not on file   Substance and Sexual Activity     Alcohol use: Not on file     Drug use: Not on file     Sexual activity: Not on file   Other Topics Concern     Not on file   Social History Narrative     Not on file     Social Determinants of Health     Financial Resource Strain: Not on file   Food Insecurity: Not on file   Transportation Needs: Not on file   Physical Activity: Not on file   Stress: Not on file   Social Connections: Not on file   Intimate Partner Violence: Not on file   Housing Stability: Not on file     Review of Systems:  Skin: No skin rash or ulcers.  Respiratory: No cough or hemoptysis.  Cardiovascular: See HPI.    Gastrointestinal: No abdominal pain, nausea, vomiting, hematemesis or melena.  Genitourinary: No increased frequency or urgency of urine. No dysuria or hematuria.  Musculoskeletal: No polyarthralgia or myalgias.  Neurologic: No  headaches, seizure or focal weakness.  Hematologic/Lymphatic/Immunologic: No bleeding tendency.    Vital signs:  /82 (BP Location: Right arm, Patient Position: Chair, Cuff Size: Adult Regular)   Pulse 82   Wt 92.2 kg (203 lb 4.8 oz)   SpO2 95%   BMI 30.91 kg/m     Wt Readings from Last 2 Encounters:   11/22/21 87.7 kg (193 lb 6.4 oz)   11/19/21 90.5 kg (199 lb 9.6 oz)     Physical Exam:  Gen: NAD.    HEENT: No conjunctival pallor or scleral icterus, MMM. Clear oropharynx.    Neck: No JVD. No thyroid enlargement or cervical adenopathy.    Chest: Clear to auscultation bilaterally.    CV: Normal first and second heart sounds. No murmurs or gallop appreciated.    Abdomen: Soft, non-tender, non-distended, BS+.  Ext: No edema. Warm and well perfused with normal capillary refill.    Skin: No skin rash or ulcers.  Neuro: alert, oriented and appropriately conversant.    Psych: Normal affect and speech.    Labs:  Last Basic Metabolic Panel:  Lab Results   Component Value Date     11/21/2021     10/26/2014      Lab Results   Component Value Date    POTASSIUM 4.4 11/21/2021    POTASSIUM 4.0 10/26/2014     Lab Results   Component Value Date    CHLORIDE 105 11/21/2021    CHLORIDE 108 10/26/2014     Lab Results   Component Value Date    BERNADETTE 9.2 11/21/2021    BERNADETTE 9.4 10/26/2014     Lab Results   Component Value Date    CO2 24 11/21/2021    CO2 26 10/26/2014     Lab Results   Component Value Date    BUN 13 11/21/2021    BUN 18 10/26/2014     Lab Results   Component Value Date    CR 0.75 11/21/2021    CR 0.88 10/26/2014     Lab Results   Component Value Date     11/21/2021    GLC 88 10/26/2014       Lab Results   Component Value Date    WBC 9.7 11/21/2021    WBC 8.1 10/26/2014     Lab Results   Component Value Date    RBC 4.47 11/21/2021    RBC 4.66 10/26/2014     Lab Results   Component Value Date    HGB 12.9 11/21/2021    HGB 13.8 10/26/2014     Lab Results   Component Value Date    HCT 40.5 11/21/2021     HCT 41.8 10/26/2014     Lab Results   Component Value Date    MCV 91 11/21/2021    MCV 90 10/26/2014     Lab Results   Component Value Date    MCH 28.9 11/21/2021    MCH 29.6 10/26/2014     Lab Results   Component Value Date    MCHC 31.9 11/21/2021    MCHC 33.0 10/26/2014     Lab Results   Component Value Date    RDW 13.3 11/21/2021    RDW 13.8 10/26/2014     Lab Results   Component Value Date     11/21/2021     10/26/2014       No results found for: INR      Diagnostics:    CMR 12/14/21:     Clinical history: Patient with hx of acute coronary syndrome and non obstructive disease on coronary  angiogram.  Comparison CMR: n/a      1. The LV is normal in cavity size with wall thinning in the apical lateral segment. The global systolic  function is mildly reduced. The LVEF is 49%. There is akinesis of the apical lateral and mid inferolateral  segments.      2. The RV is normal in cavity size. The global systolic function is normal. The RVEF is 64%.      3. Both atria are normal in size.     4. There is no significant valvular disease.      5. Late gadolinium enhancement imaging shows hyperenhancement in the mid infeorlateral and apical lateral  segments. These findings are consistent with a prior myocardial infarction in the left circumflex  territory.      6. There is no pericardial effusion or thickening.     7. There is no intracardiac thrombus.     CONCLUSIONS: Ischemic cardiomyopathy with mildly reduced LV function, LVEF of 49% and evidence of prior  myocardial infarction in the LCx territory, MINOCA. Normal RV function, RVEF of 64%.     Coronary angiogram 11/21/21:    61F with no significant past medical history who presented with chest pain, dynamic ST changes, troponin elevation who is referred to cath lab for coronary angiogram with possible intervention.       Pre Procedure Diagnosis    ACS > 24 hours         Conclusion         Left ventricular filling pressures are normal.    Hemodynamic data  has been modified in [facility] per physician review.     MINOCA              Plan      Follow bedrest per protocol    Continued medical management and lifestyle modifications for cardiovascular risk factor optimizations.    Arterial sheath removed from radial artery with TR band placement.    Cardiac rehabilitation.    Return to the primary inpatient team for further evaluation and managmenet      Start Imdur and wean nitroglycerin drip.  Cardiac MRI as outpatient.  Discharge tomorrow if pain free on PO nitrates. PRN SL nitroglycerin.       Assessment and Plan:  This is a 61 year old with risk factor profile: -HTN, -HLD, -DM, - tobacco use, + family of CA who presents for NSTEMI follow-up.    #MINOCA  ##Ischemia CMY EF 49:  Patient was recently admitted to [facility] with an NSTEMI, found to have normal coronary arteries, CMR confirms LCx territory MI. Suspect coronary vasospasm. She does report some atypical chest pain but no exertional angina.   - Continue ASA lifelong   - Start Plavix 75 mg daily and continue for 12 months if no bleeding concerns   - Continue atorvastatin 40 mg daily  - Increase Imdur to 60 mg daily  - Cardiac Rehab [facility]     Follow-up: RTC in 1 month prior to leaving for [place].          Please do not hesitate to contact me if you have any questions/concerns.     Sincerely,     Venice Orr NP

## 2022-01-03 NOTE — PATIENT INSTRUCTIONS
You were seen today in the Cardiovascular Clinic at the [facility].    Cardiology provider you saw during your visit Venice Orr NP.    1. Increase Imdur to 60 mg daily.  2. Start cardiac rehab at [facility] ([phone]).  3. Please make a follow-up with Dr. Cope on  with labs prior.     Questions and [phone].   First press #1 for the University and then press #3 for Medical Questions to reach the Cardiology triage nurse.     On Call Cardiologist for after hours or on weekends: [phone], press option #4 and ask to speak to the on-call Cardiologist.

## 2022-01-03 NOTE — NURSING NOTE
Chief Complaint   Patient presents with     Follow Up     62 yo FM here for review of cMRI results ordered in the setting of an MI with non obstructive coronary artery disease.     Vitals were taken and medications reconciled.    Dionisio Clark, NAIN  8:31 AM

## 2022-01-03 NOTE — PROGRESS NOTES
Date: 1/3/2022    Time of Call: 9:43 AM     Diagnosis:  NSTEMI     [ TORB ] Ordering provider: Raymundo Cope MD  Order: Cardiac rehab     Order received by: Jennifer Melendez RN     Follow-up/additional notes:

## 2022-01-04 RX ORDER — CLOPIDOGREL BISULFATE 75 MG/1
75 TABLET ORAL DAILY
Qty: 90 TABLET | Refills: 3 | Status: SHIPPED | OUTPATIENT
Start: 2022-01-04 | End: 2022-10-19

## 2022-01-05 ENCOUNTER — HOSPITAL ENCOUNTER (OUTPATIENT)
Dept: CARDIAC REHAB | Facility: CLINIC | Age: 62
End: 2022-01-05
Attending: INTERNAL MEDICINE
Payer: COMMERCIAL

## 2022-01-05 DIAGNOSIS — I21.4 NSTEMI (NON-ST ELEVATED MYOCARDIAL INFARCTION) (H): ICD-10-CM

## 2022-01-05 PROCEDURE — 93797 PHYS/QHP OP CAR RHAB WO ECG: CPT | Mod: XU | Performed by: REHABILITATION PRACTITIONER

## 2022-01-05 PROCEDURE — 93798 PHYS/QHP OP CAR RHAB W/ECG: CPT | Performed by: REHABILITATION PRACTITIONER

## 2022-01-06 ENCOUNTER — HOSPITAL ENCOUNTER (OUTPATIENT)
Dept: CARDIAC REHAB | Facility: CLINIC | Age: 62
End: 2022-01-06
Attending: INTERNAL MEDICINE
Payer: COMMERCIAL

## 2022-01-06 PROCEDURE — 93798 PHYS/QHP OP CAR RHAB W/ECG: CPT | Performed by: REHABILITATION PRACTITIONER

## 2022-01-07 ENCOUNTER — HOSPITAL ENCOUNTER (OUTPATIENT)
Dept: CARDIAC REHAB | Facility: CLINIC | Age: 62
End: 2022-01-07
Attending: INTERNAL MEDICINE
Payer: COMMERCIAL

## 2022-01-07 PROCEDURE — 93798 PHYS/QHP OP CAR RHAB W/ECG: CPT

## 2022-01-10 ENCOUNTER — HOSPITAL ENCOUNTER (OUTPATIENT)
Dept: CARDIAC REHAB | Facility: CLINIC | Age: 62
End: 2022-01-10
Attending: INTERNAL MEDICINE
Payer: COMMERCIAL

## 2022-01-10 PROCEDURE — 93798 PHYS/QHP OP CAR RHAB W/ECG: CPT | Performed by: REHABILITATION PRACTITIONER

## 2022-01-12 ENCOUNTER — HOSPITAL ENCOUNTER (OUTPATIENT)
Dept: CARDIAC REHAB | Facility: CLINIC | Age: 62
End: 2022-01-12
Attending: INTERNAL MEDICINE
Payer: COMMERCIAL

## 2022-01-12 PROCEDURE — 93798 PHYS/QHP OP CAR RHAB W/ECG: CPT

## 2022-01-14 ENCOUNTER — HOSPITAL ENCOUNTER (OUTPATIENT)
Dept: CARDIAC REHAB | Facility: CLINIC | Age: 62
End: 2022-01-14
Attending: INTERNAL MEDICINE
Payer: COMMERCIAL

## 2022-01-14 PROCEDURE — 93797 PHYS/QHP OP CAR RHAB WO ECG: CPT | Mod: XU | Performed by: OCCUPATIONAL THERAPIST

## 2022-01-14 PROCEDURE — 93798 PHYS/QHP OP CAR RHAB W/ECG: CPT | Performed by: REHABILITATION PRACTITIONER

## 2022-01-14 PROCEDURE — 999N000108 HC STATISTIC OP CARDIAC VISIT #2: Performed by: OCCUPATIONAL THERAPIST

## 2022-01-17 ENCOUNTER — HOSPITAL ENCOUNTER (OUTPATIENT)
Dept: CARDIAC REHAB | Facility: CLINIC | Age: 62
End: 2022-01-17
Attending: INTERNAL MEDICINE
Payer: COMMERCIAL

## 2022-01-17 PROCEDURE — 93798 PHYS/QHP OP CAR RHAB W/ECG: CPT | Performed by: REHABILITATION PRACTITIONER

## 2022-01-17 PROCEDURE — 93797 PHYS/QHP OP CAR RHAB WO ECG: CPT | Mod: XU

## 2022-01-20 ENCOUNTER — OFFICE VISIT (OUTPATIENT)
Dept: CARDIOLOGY | Facility: CLINIC | Age: 62
End: 2022-01-20
Attending: INTERNAL MEDICINE
Payer: COMMERCIAL

## 2022-01-20 ENCOUNTER — LAB (OUTPATIENT)
Dept: LAB | Facility: CLINIC | Age: 62
End: 2022-01-20
Attending: INTERNAL MEDICINE
Payer: COMMERCIAL

## 2022-01-20 VITALS
HEIGHT: 67 IN | OXYGEN SATURATION: 99 % | BODY MASS INDEX: 30.93 KG/M2 | SYSTOLIC BLOOD PRESSURE: 125 MMHG | DIASTOLIC BLOOD PRESSURE: 85 MMHG | WEIGHT: 197.1 LBS | HEART RATE: 68 BPM

## 2022-01-20 DIAGNOSIS — I21.4 NSTEMI (NON-ST ELEVATED MYOCARDIAL INFARCTION) (H): ICD-10-CM

## 2022-01-20 DIAGNOSIS — I21.4 NSTEMI (NON-ST ELEVATED MYOCARDIAL INFARCTION) (H): Primary | ICD-10-CM

## 2022-01-20 LAB
ALBUMIN SERPL-MCNC: 4.2 G/DL (ref 3.4–5)
ALP SERPL-CCNC: 90 U/L (ref 40–150)
ALT SERPL W P-5'-P-CCNC: 33 U/L (ref 0–50)
ANION GAP SERPL CALCULATED.3IONS-SCNC: 8 MMOL/L (ref 3–14)
AST SERPL W P-5'-P-CCNC: 26 U/L (ref 0–45)
BILIRUB SERPL-MCNC: 0.7 MG/DL (ref 0.2–1.3)
BUN SERPL-MCNC: 14 MG/DL (ref 7–30)
CALCIUM SERPL-MCNC: 9.2 MG/DL (ref 8.5–10.1)
CHLORIDE BLD-SCNC: 106 MMOL/L (ref 94–109)
CHOLEST SERPL-MCNC: 124 MG/DL
CO2 SERPL-SCNC: 26 MMOL/L (ref 20–32)
CREAT SERPL-MCNC: 0.82 MG/DL (ref 0.52–1.04)
FASTING STATUS PATIENT QL REPORTED: NORMAL
GFR SERPL CREATININE-BSD FRML MDRD: 81 ML/MIN/1.73M2
GLUCOSE BLD-MCNC: 88 MG/DL (ref 70–99)
HDLC SERPL-MCNC: 63 MG/DL
LDLC SERPL CALC-MCNC: 44 MG/DL
NONHDLC SERPL-MCNC: 61 MG/DL
POTASSIUM BLD-SCNC: 3.9 MMOL/L (ref 3.4–5.3)
PROT SERPL-MCNC: 7 G/DL (ref 6.8–8.8)
SODIUM SERPL-SCNC: 140 MMOL/L (ref 133–144)
TRIGL SERPL-MCNC: 84 MG/DL

## 2022-01-20 PROCEDURE — 36415 COLL VENOUS BLD VENIPUNCTURE: CPT | Performed by: PATHOLOGY

## 2022-01-20 PROCEDURE — G0463 HOSPITAL OUTPT CLINIC VISIT: HCPCS

## 2022-01-20 PROCEDURE — 80061 LIPID PANEL: CPT | Performed by: PATHOLOGY

## 2022-01-20 PROCEDURE — 80053 COMPREHEN METABOLIC PANEL: CPT | Performed by: PATHOLOGY

## 2022-01-20 PROCEDURE — 99213 OFFICE O/P EST LOW 20 MIN: CPT | Performed by: INTERNAL MEDICINE

## 2022-01-20 RX ORDER — ISOSORBIDE MONONITRATE 60 MG/1
60 TABLET, EXTENDED RELEASE ORAL DAILY
Qty: 30 TABLET | Refills: 11 | Status: SHIPPED | OUTPATIENT
Start: 2022-01-20 | End: 2022-10-31

## 2022-01-20 RX ORDER — NITROGLYCERIN 0.4 MG/1
TABLET SUBLINGUAL
Qty: 30 TABLET | Refills: 3 | Status: SHIPPED | OUTPATIENT
Start: 2022-01-20 | End: 2022-10-31

## 2022-01-20 ASSESSMENT — PAIN SCALES - GENERAL: PAINLEVEL: NO PAIN (0)

## 2022-01-20 ASSESSMENT — MIFFLIN-ST. JEOR: SCORE: 1485.54

## 2022-01-20 NOTE — LETTER
1/20/2022      RE: Terese Gonzalez  [address]  [facility] [address]       Dear Colleague,    Thank you for the opportunity to participate in the care of your patient, Terese Gonzalez, at the [facility] [place] [facility]. Please see a copy of my visit note below.    CARDIOLOGY CLINIC FOLLOW UP    HPI: Terese Gonzalez is a 61 year old female, being seen today for recheck of MINOCA.     She presented with an NSTEMI in November and underwent a coronary angiogram which was normal. There was no evidence of obstructive coronary artery disease, in fact her coronary arteries looked angiographically normal. She was initially treated as MINOCA and was started on DAPT, high intensity statin, and Venice Orr has been titrating her Imdur up to 60 mg PO daily. She is no asymptomatic. She has been doing cardiac rehabilitation and has been doing well and feels that this has helped. She feels that there were a lot of missed issues at the beginning of her care including lack of cardiac rehab referral, poor MRI scheduling, etc. I have acknowledged her concerns regarding these issues.     She does not report any additional chest pain, shortness of breath, orthopnea, PND, palpitations, lightheadedness or syncope since increasing the Imdur dosage.    We reviewed her angiographic films today and discussed the pathophysiology of coronary spasm, its recurrence, and future plan and follow up.    PAST MEDICAL HISTORY:  No past medical history on file.    CURRENT MEDICATIONS:  Current Outpatient Medications   Medication Sig Dispense Refill     acetaminophen (TYLENOL 8 HOUR) 650 MG CR tablet Take 1 tablet by mouth every 8 hours as needed for pain. 100 tablet 11     aspirin (ASA) 81 MG EC tablet Take 1 tablet (81 mg) by mouth daily 90 tablet 3     atorvastatin (LIPITOR) 40 MG tablet Take 1 tablet (40 mg) by mouth daily 90 tablet 3     cetirizine (ZYRTEC) 10 MG  tablet Take 10 mg by mouth daily       clopidogrel (PLAVIX) 75 MG tablet Take 1 tablet (75 mg) by mouth daily 90 tablet 3     cyanocobalamin (VITAMIN B-12) 1000 MCG tablet Take 1,000 mcg by mouth daily       DULoxetine (CYMBALTA) 20 MG capsule Take 20 mg by mouth 2 times daily       isosorbide mononitrate (IMDUR) 60 MG 24 hr tablet Take 1 tablet (60 mg) by mouth daily 30 tablet 11     nitroGLYcerin (NITROSTAT) 0.4 MG sublingual tablet For chest pain place 1 tablet under the tongue every 5 minutes for 3 doses. If symptoms persist 5 minutes after 1st dose call 911. 30 tablet 3       PAST SURGICAL HISTORY:  Past Surgical History:   Procedure Laterality Date     CV CORONARY ANGIOGRAM N/A 11/21/2021    Procedure: Coronary Angiogram;  Surgeon: Raymundo Cope MD;  Location:  HEART CARDIAC CATH LAB     CV LEFT HEART CATH N/A 11/21/2021    Procedure: Left Heart Cath;  Surgeon: Raymundo Cope MD;  Location:  HEART CARDIAC CATH LAB       ALLERGIES  Patient has no known allergies.    FAMILY HX:  No family history on file.    SOCIAL HX:  Social History     Socioeconomic History     Marital status:      Spouse name: None     Number of children: None     Years of education: None     Highest education level: None   Occupational History     None   Tobacco Use     Smoking status: Never Smoker     Smokeless tobacco: Never Used   Substance and Sexual Activity     Alcohol use: None     Drug use: None     Sexual activity: None   Other Topics Concern     None   Social History Narrative     None     Social Determinants of Health     Financial Resource Strain: Not on file   Food Insecurity: Not on file   Transportation Needs: Not on file   Physical Activity: Not on file   Stress: Not on file   Social Connections: Not on file   Intimate Partner Violence: Not on file   Housing Stability: Not on file       ROS:  Constitutional: No fever, chills, or sweats. No weight gain/loss.   ENT: No visual  "disturbance, ear ache, epistaxis, sore throat.   Allergies/Immunologic: Negative.   Respiratory: No cough, hemoptysis.   Cardiovascular: As per HPI.   GI: No nausea, vomiting, hematemesis, melena, or hematochezia.   : No urinary frequency, dysuria, or hematuria.   Integument: Negative.   Psychiatric: Negative.   Neuro: Negative.   Endocrinology: Negative.   Musculoskeletal: No myalgia.    VITAL SIGNS:  /85 (BP Location: Left arm, Patient Position: Chair, Cuff Size: Adult Large)   Pulse 68   Ht 1.692 m (5' 6.61\")   Wt 89.4 kg (197 lb 1.6 oz)   SpO2 99%   BMI 31.23 kg/m    Body mass index is 31.23 kg/m .  Wt Readings from Last 2 Encounters:   01/20/22 89.4 kg (197 lb 1.6 oz)   01/03/22 92.2 kg (203 lb 4.8 oz)       PHYSICAL EXAM  Terese Gonzalez is a 61 year old female in no acute distress.  HEENT: Unremarkable.  Neck: JVP normal.  Carotids +4/4 bilaterally without bruits.  Lungs: CTA.  Cor: RRR. Normal S1 and S2.  No murmur, rub, or gallop.  PMI in Lf 5th ICS.  Abd: Soft, nontender, nondistended.  NABS.  No pulsatile mass.  Extremities: No C/C/E.  Pulses +4/4 symmetric in upper and lower extremities.  Neuro: Grossly intact.    LABS    Lab Results   Component Value Date    WBC 9.7 11/21/2021    WBC 8.1 10/26/2014     Lab Results   Component Value Date    RBC 4.47 11/21/2021    RBC 4.66 10/26/2014     Lab Results   Component Value Date    HGB 12.9 11/21/2021    HGB 13.8 10/26/2014     Lab Results   Component Value Date    HCT 40.5 11/21/2021    HCT 41.8 10/26/2014     No components found for: MCT  Lab Results   Component Value Date    MCV 91 11/21/2021    MCV 90 10/26/2014     Lab Results   Component Value Date    MCH 28.9 11/21/2021    MCH 29.6 10/26/2014     Lab Results   Component Value Date    MCHC 31.9 11/21/2021    MCHC 33.0 10/26/2014     Lab Results   Component Value Date    RDW 13.3 11/21/2021    RDW 13.8 10/26/2014     Lab Results   Component Value Date     11/21/2021     " 10/26/2014      Recent Labs   Lab Test 22  0830 21  0508    136   POTASSIUM 3.9 4.4   CHLORIDE 106 105   CO2 26 24   ANIONGAP 8 7   GLC 88 106*   BUN 14 13   CR 0.82 0.75   BERNADETTE 9.2 9.2     Recent Labs   Lab Test 22  0830 21  0617   CHOL 124 216*   HDL 63 49*   LDL 44 138*   TRIG 84 147   NHDL 61 167*        EK21  Sinus  Inferior MI of indeterminate age    ECHO: 21  The visual ejection fraction is estimated at 55%.  distal posterolateral hypokinesis  The right ventricle is normal in structure, function and size.  There is trace to mild tricuspid regurgitation.  The patient exhibited occasional PVCs.  There is no comparison study available.  ______________________________________________________________________________      MRI TEST:  21  1. The LV is normal in cavity size with wall thinning in the apical lateral segment. The global systolic  function is mildly reduced. The LVEF is 49%. There is akinesis of the apical lateral and mid inferolateral  segments.      2. The RV is normal in cavity size. The global systolic function is normal. The RVEF is 64%.      3. Both atria are normal in size.     4. There is no significant valvular disease.      5. Late gadolinium enhancement imaging shows hyperenhancement in the mid infeorlateral and apical lateral  segments. These findings are consistent with a prior myocardial infarction in the left circumflex  territory.      6. There is no pericardial effusion or thickening.     7. There is no intracardiac thrombus.     CONCLUSIONS: Ischemic cardiomyopathy with mildly reduced LV function, LVEF of 49% and evidence of prior  myocardial infarction in the LCx territory, MINOCA. Normal RV function, RVEF of 64%.     CARDIAC CATH:  21  Conclusion         Left ventricular filling pressures are normal.    Hemodynamic data has been modified in Epic per physician review.     MINOCA              Plan      Follow bedrest per  protocol    Continued medical management and lifestyle modifications for cardiovascular risk factor optimizations.    Arterial sheath removed from radial artery with TR band placement.    Cardiac rehabilitation.    Return to the primary inpatient team for further evaluation and managmenet      Start Imdur and wean nitroglycerin drip.  Cardiac MRI as outpatient.  Discharge tomorrow if pain free on PO nitrates. PRN SL nitroglycerin.       ASSESSMENT AND PLAN:    1. MINOCA; likely coronary vasospasm  -- DAPT for 12 months  -- statin lifelong  -- Imdur 60 mg PO daily  -- cardiac rehab  -- echo at next visit    RTC annually    Raymundo Cope MD

## 2022-01-20 NOTE — PATIENT INSTRUCTIONS
It was a pleasure to see you in the cardiology clinic today.    Cardiologist you saw today:  Dr. Anatoliy MD    Patient Instructions:    Medication Changes:   -- We are adding sublingual Nitroglycerin (NTG) to your medication list.  --We have refilled your Imdur       Studies Ordered: Echocardiogram, in one year       Please follow up:  Follow up in one year with Dr. Cope with an echocardiogram prior.     =========================================================    We are encouraging the use of [employer] to communicate with your HealthCare Provider    If you have any questions, please feel free to contact Renate Mackay RN, at [phone].  Press Option #1 for the [facility], and then press Option #4 for nursing.    If you have an urgent need after hours (8:00 am to 4:30 pm) please call [phone] and ask for the cardiology fellow on call.              Nitroglycerin Sublingual tablet   What is this medicine?  NITROGLYCERIN (vinh troe GLI ser in) is a type of vasodilator. It relaxes blood vessels, increasing the blood and oxygen supply to your heart. This medicine is used to relieve chest pain caused by angina. It is also used to prevent chest pain before activities like climbing stairs, going outdoors in cold weather, or sexual activity.  This medicine may be used for other purposes; ask your health care provider or pharmacist if you have questions.  What should I tell my health care provider before I take this medicine?  They need to know if you have any of these conditions:    anemia    head injury, recent stroke, or bleeding in the brain    liver disease    previous heart attack    an unusual or allergic reaction to nitroglycerin, other medicines, foods, dyes, or preservatives    pregnant or trying to get pregnant    breast-feeding  How should I use this medicine?  Take this medicine by mouth as needed. At the first sign of an angina attack (chest pain or tightness) place one  tablet under your tongue. You can also take this medicine 5 to 10 minutes before an event likely to produce chest pain. Follow the directions on the prescription label. Let the tablet dissolve under the tongue. Do not swallow whole. Replace the dose if you accidentally swallow it. It will help if your mouth is not dry. Saliva around the tablet will help it to dissolve more quickly. Do not eat or drink, smoke or chew tobacco while a tablet is dissolving. If you are not better within 5 minutes after taking ONE dose of nitroglycerin, call 9-1-1 immediately to seek emergency medical care. Do not take more than 3 nitroglycerin tablets over 15 minutes.  If you take this medicine often to relieve symptoms of angina, your doctor or health care professional may provide you with different instructions to manage your symptoms. If symptoms do not go away after following these instructions, it is important to call 9-1-1 immediately. Do not take more than 3 nitroglycerin tablets over 15 minutes.  Talk to your pediatrician regarding the use of this medicine in children. Special care may be needed.  Overdosage: If you think you have taken too much of this medicine contact a poison control center or emergency room at once.  NOTE: This medicine is only for you. Do not share this medicine with others.  What if I miss a dose?  This does not apply. This medicine is only used as needed.  What may interact with this medicine?  Do not take this medicine with any of the following medications:    certain migraine medicines like ergotamine and dihydroergotamine (DHE)    medicines used to treat erectile dysfunction like sildenafil, tadalafil, and vardenafil    riociguat  This medicine may also interact with the following medications:    alteplase    aspirin    heparin    medicines for high blood pressure    medicines for mental depression    other medicines used to treat angina    phenothiazines like chlorpromazine, mesoridazine,  prochlorperazine, thioridazine  This list may not describe all possible interactions. Give your health care provider a list of all the medicines, herbs, non-prescription drugs, or dietary supplements you use. Also tell them if you smoke, drink alcohol, or use illegal drugs. Some items may interact with your medicine.  What should I watch for while using this medicine?  Tell your doctor or health care professional if you feel your medicine is no longer working.  Keep this medicine with you at all times. Sit or lie down when you take your medicine to prevent falling if you feel dizzy or faint after using it. Try to remain calm. This will help you to feel better faster. If you feel dizzy, take several deep breaths and lie down with your feet propped up, or bend forward with your head resting between your knees.  You may get drowsy or dizzy. Do not drive, use machinery, or do anything that needs mental alertness until you know how this drug affects you. Do not stand or sit up quickly, especially if you are an older patient. This reduces the risk of dizzy or fainting spells. Alcohol can make you more drowsy and dizzy. Avoid alcoholic drinks.  Do not treat yourself for coughs, colds, or pain while you are taking this medicine without asking your doctor or health care professional for advice. Some ingredients may increase your blood pressure.  What side effects may I notice from receiving this medicine?  Side effects that you should report to your doctor or health care professional as soon as possible:    blurred vision    dry mouth    skin rash    sweating    the feeling of extreme pressure in the head    unusually weak or tired  Side effects that usually do not require medical attention (report to your doctor or health care professional if they continue or are bothersome):    flushing of the face or neck    headache    irregular heartbeat, palpitations    nausea, vomiting  This list may not describe all possible side  effects. Call your doctor for medical advice about side effects. You may report side effects to FDA at 7-638-FLB-8238.  Where should I keep my medicine?  Keep out of the reach of children.  Store at room temperature between 20 and 25 degrees C (68 and 77 degrees F). Store in original container. Protect from light and moisture. Keep tightly closed. Throw away any unused medicine after the expiration date.  NOTE:This sheet is a summary. It may not cover all possible information. If you have questions about this medicine, talk to your doctor, pharmacist, or health care provider. Copyright  2016 [employer]

## 2022-01-20 NOTE — PROGRESS NOTES
CARDIOLOGY CLINIC FOLLOW UP    HPI: Terese Gonzalez is a 61 year old female, being seen today for recheck of MINOCA.     She presented with an NSTEMI in November and underwent a coronary angiogram which was normal. There was no evidence of obstructive coronary artery disease, in fact her coronary arteries looked angiographically normal. She was initially treated as MINOCA and was started on DAPT, high intensity statin, and Venice Orr has been titrating her Imdur up to 60 mg PO daily. She is no asymptomatic. She has been doing cardiac rehabilitation and has been doing well and feels that this has helped. She feels that there were a lot of missed issues at the beginning of her care including lack of cardiac rehab referral, poor MRI scheduling, etc. I have acknowledged her concerns regarding these issues.     She does not report any additional chest pain, shortness of breath, orthopnea, PND, palpitations, lightheadedness or syncope since increasing the Imdur dosage.    We reviewed her angiographic films today and discussed the pathophysiology of coronary spasm, its recurrence, and future plan and follow up.    PAST MEDICAL HISTORY:  No past medical history on file.    CURRENT MEDICATIONS:  Current Outpatient Medications   Medication Sig Dispense Refill     acetaminophen (TYLENOL 8 HOUR) 650 MG CR tablet Take 1 tablet by mouth every 8 hours as needed for pain. 100 tablet 11     aspirin (ASA) 81 MG EC tablet Take 1 tablet (81 mg) by mouth daily 90 tablet 3     atorvastatin (LIPITOR) 40 MG tablet Take 1 tablet (40 mg) by mouth daily 90 tablet 3     cetirizine (ZYRTEC) 10 MG tablet Take 10 mg by mouth daily       clopidogrel (PLAVIX) 75 MG tablet Take 1 tablet (75 mg) by mouth daily 90 tablet 3     cyanocobalamin (VITAMIN B-12) 1000 MCG tablet Take 1,000 mcg by mouth daily       DULoxetine (CYMBALTA) 20 MG capsule Take 20 mg by mouth 2 times daily       isosorbide mononitrate (IMDUR) 60 MG 24 hr tablet Take 1 tablet  (60 mg) by mouth daily 30 tablet 11     nitroGLYcerin (NITROSTAT) 0.4 MG sublingual tablet For chest pain place 1 tablet under the tongue every 5 minutes for 3 doses. If symptoms persist 5 minutes after 1st dose call 911. 30 tablet 3       PAST SURGICAL HISTORY:  Past Surgical History:   Procedure Laterality Date     CV CORONARY ANGIOGRAM N/A 11/21/2021    Procedure: Coronary Angiogram;  Surgeon: Raymundo Cope MD;  Location:  HEART CARDIAC CATH LAB     CV LEFT HEART CATH N/A 11/21/2021    Procedure: Left Heart Cath;  Surgeon: Raymundo Cope MD;  Location: [facility]       ALLERGIES  Patient has no known allergies.    FAMILY HX:  No family history on file.    SOCIAL HX:  Social History     Socioeconomic History     Marital status:      Spouse name: None     Number of children: None     Years of education: None     Highest education level: None   Occupational History     None   Tobacco Use     Smoking status: Never Smoker     Smokeless tobacco: Never Used   Substance and Sexual Activity     Alcohol use: None     Drug use: None     Sexual activity: None   Other Topics Concern     None   Social History Narrative     None     Social Determinants of Health     Financial Resource Strain: Not on file   Food Insecurity: Not on file   Transportation Needs: Not on file   Physical Activity: Not on file   Stress: Not on file   Social Connections: Not on file   Intimate Partner Violence: Not on file   Housing Stability: Not on file       ROS:  Constitutional: No fever, chills, or sweats. No weight gain/loss.   ENT: No visual disturbance, ear ache, epistaxis, sore throat.   Allergies/Immunologic: Negative.   Respiratory: No cough, hemoptysis.   Cardiovascular: As per HPI.   GI: No nausea, vomiting, hematemesis, melena, or hematochezia.   : No urinary frequency, dysuria, or hematuria.   Integument: Negative.   Psychiatric: Negative.   Neuro: Negative.   Endocrinology:  "Negative.   Musculoskeletal: No myalgia.    VITAL SIGNS:  /85 (BP Location: Left arm, Patient Position: Chair, Cuff Size: Adult Large)   Pulse 68   Ht 1.692 m (5' 6.61\")   Wt 89.4 kg (197 lb 1.6 oz)   SpO2 99%   BMI 31.23 kg/m    Body mass index is 31.23 kg/m .  Wt Readings from Last 2 Encounters:   01/20/22 89.4 kg (197 lb 1.6 oz)   01/03/22 92.2 kg (203 lb 4.8 oz)       PHYSICAL EXAM  Terese Gonzalez is a 61 year old female in no acute distress.  HEENT: Unremarkable.  Neck: JVP normal.  Carotids +4/4 bilaterally without bruits.  Lungs: CTA.  Cor: RRR. Normal S1 and S2.  No murmur, rub, or gallop.  PMI in Lf 5th ICS.  Abd: Soft, nontender, nondistended.  NABS.  No pulsatile mass.  Extremities: No C/C/E.  Pulses +4/4 symmetric in upper and lower extremities.  Neuro: Grossly intact.    LABS    Lab Results   Component Value Date    WBC 9.7 11/21/2021    WBC 8.1 10/26/2014     Lab Results   Component Value Date    RBC 4.47 11/21/2021    RBC 4.66 10/26/2014     Lab Results   Component Value Date    HGB 12.9 11/21/2021    HGB 13.8 10/26/2014     Lab Results   Component Value Date    HCT 40.5 11/21/2021    HCT 41.8 10/26/2014     No components found for: MCT  Lab Results   Component Value Date    MCV 91 11/21/2021    MCV 90 10/26/2014     Lab Results   Component Value Date    MCH 28.9 11/21/2021    MCH 29.6 10/26/2014     Lab Results   Component Value Date    MCHC 31.9 11/21/2021    MCHC 33.0 10/26/2014     Lab Results   Component Value Date    RDW 13.3 11/21/2021    RDW 13.8 10/26/2014     Lab Results   Component Value Date     11/21/2021     10/26/2014      Recent Labs   Lab Test 01/20/22  0830 11/21/21  0508    136   POTASSIUM 3.9 4.4   CHLORIDE 106 105   CO2 26 24   ANIONGAP 8 7   GLC 88 106*   BUN 14 13   CR 0.82 0.75   BERNADETTE 9.2 9.2     Recent Labs   Lab Test 01/20/22  0830 11/20/21  0617   CHOL 124 216*   HDL 63 49*   LDL 44 138*   TRIG 84 147   NHDL 61 167*        EKG:  " 11/20/21  Sinus  Inferior MI of indeterminate age    ECHO: 11/19/21  The visual ejection fraction is estimated at 55%.  distal posterolateral hypokinesis  The right ventricle is normal in structure, function and size.  There is trace to mild tricuspid regurgitation.  The patient exhibited occasional PVCs.  There is no comparison study available.  ______________________________________________________________________________      MRI TEST:  12/14/21  1. The LV is normal in cavity size with wall thinning in the apical lateral segment. The global systolic  function is mildly reduced. The LVEF is 49%. There is akinesis of the apical lateral and mid inferolateral  segments.      2. The RV is normal in cavity size. The global systolic function is normal. The RVEF is 64%.      3. Both atria are normal in size.     4. There is no significant valvular disease.      5. Late gadolinium enhancement imaging shows hyperenhancement in the mid infeorlateral and apical lateral  segments. These findings are consistent with a prior myocardial infarction in the left circumflex  territory.      6. There is no pericardial effusion or thickening.     7. There is no intracardiac thrombus.     CONCLUSIONS: Ischemic cardiomyopathy with mildly reduced LV function, LVEF of 49% and evidence of prior  [facility]. Normal RV function, RVEF of 64%.     CARDIAC CATH:  11/21/21  Conclusion         Left ventricular filling pressures are normal.    Hemodynamic data has been modified in [facility] per physician review.     MINOCA              Plan      Follow bedrest per protocol    Continued medical management and lifestyle modifications for cardiovascular risk factor optimizations.    Arterial sheath removed from radial artery with TR band placement.    Cardiac rehabilitation.    Return to the primary inpatient team for further evaluation and managmenet      Start Imdur and wean nitroglycerin drip.  Cardiac MRI as  outpatient.  Discharge tomorrow if pain free on PO nitrates. PRN SL nitroglycerin.       ASSESSMENT AND PLAN:    1. MINOCA; likely coronary vasospasm  -- DAPT for 12 months  -- statin lifelong  -- Imdur 60 mg PO daily  -- cardiac rehab  -- echo at next visit    RTC annually    Raymundo Cope MD

## 2022-01-20 NOTE — NURSING NOTE
Chief Complaint   Patient presents with     Follow Up     referral from Venice Orr NP post cath     Vitals were taken and medications reconciled.    Giuseppe Looney, NAIN  8:48 AM

## 2022-01-21 ENCOUNTER — HOSPITAL ENCOUNTER (OUTPATIENT)
Dept: CARDIAC REHAB | Facility: CLINIC | Age: 62
End: 2022-01-21
Attending: INTERNAL MEDICINE
Payer: COMMERCIAL

## 2022-01-21 PROCEDURE — 93798 PHYS/QHP OP CAR RHAB W/ECG: CPT | Performed by: OCCUPATIONAL THERAPIST

## 2022-01-24 ENCOUNTER — HOSPITAL ENCOUNTER (OUTPATIENT)
Dept: CARDIAC REHAB | Facility: CLINIC | Age: 62
End: 2022-01-24
Attending: INTERNAL MEDICINE
Payer: COMMERCIAL

## 2022-01-24 PROCEDURE — 93798 PHYS/QHP OP CAR RHAB W/ECG: CPT

## 2022-01-27 DIAGNOSIS — I21.4 NSTEMI (NON-ST ELEVATED MYOCARDIAL INFARCTION) (H): Primary | ICD-10-CM

## 2022-03-24 ENCOUNTER — HOSPITAL ENCOUNTER (OUTPATIENT)
Dept: CARDIAC REHAB | Facility: CLINIC | Age: 62
Discharge: HOME | End: 2022-03-24
Attending: INTERNAL MEDICINE
Payer: COMMERCIAL

## 2022-03-24 PROCEDURE — 93798 PHYS/QHP OP CAR RHAB W/ECG: CPT | Performed by: REHABILITATION PRACTITIONER

## 2022-03-31 ENCOUNTER — HOSPITAL ENCOUNTER (OUTPATIENT)
Dept: CARDIAC REHAB | Facility: CLINIC | Age: 62
Discharge: HOME | End: 2022-03-31
Attending: INTERNAL MEDICINE
Payer: COMMERCIAL

## 2022-03-31 PROCEDURE — 93798 PHYS/QHP OP CAR RHAB W/ECG: CPT | Performed by: REHABILITATION PRACTITIONER

## 2022-04-05 ENCOUNTER — HOSPITAL ENCOUNTER (OUTPATIENT)
Dept: CARDIAC REHAB | Facility: CLINIC | Age: 62
Discharge: HOME | End: 2022-04-05
Attending: INTERNAL MEDICINE
Payer: COMMERCIAL

## 2022-04-05 PROCEDURE — 93798 PHYS/QHP OP CAR RHAB W/ECG: CPT | Performed by: REHABILITATION PRACTITIONER

## 2022-04-07 ENCOUNTER — HOSPITAL ENCOUNTER (OUTPATIENT)
Dept: CARDIAC REHAB | Facility: CLINIC | Age: 62
Discharge: HOME | End: 2022-04-07
Attending: INTERNAL MEDICINE
Payer: COMMERCIAL

## 2022-04-07 PROCEDURE — 93798 PHYS/QHP OP CAR RHAB W/ECG: CPT | Performed by: REHABILITATION PRACTITIONER

## 2022-04-14 ENCOUNTER — HOSPITAL ENCOUNTER (OUTPATIENT)
Dept: CARDIAC REHAB | Facility: CLINIC | Age: 62
Discharge: HOME | End: 2022-04-14
Attending: INTERNAL MEDICINE
Payer: COMMERCIAL

## 2022-04-14 PROCEDURE — 93798 PHYS/QHP OP CAR RHAB W/ECG: CPT | Performed by: REHABILITATION PRACTITIONER

## 2022-04-18 ENCOUNTER — HOSPITAL ENCOUNTER (OUTPATIENT)
Dept: CARDIAC REHAB | Facility: CLINIC | Age: 62
Discharge: HOME | End: 2022-04-18
Attending: INTERNAL MEDICINE
Payer: COMMERCIAL

## 2022-04-18 PROCEDURE — 93798 PHYS/QHP OP CAR RHAB W/ECG: CPT | Performed by: REHABILITATION PRACTITIONER

## 2022-04-20 ENCOUNTER — HOSPITAL ENCOUNTER (OUTPATIENT)
Dept: CARDIAC REHAB | Facility: CLINIC | Age: 62
Discharge: HOME | End: 2022-04-20
Attending: INTERNAL MEDICINE
Payer: COMMERCIAL

## 2022-04-20 PROCEDURE — 93797 PHYS/QHP OP CAR RHAB WO ECG: CPT | Mod: XU | Performed by: REHABILITATION PRACTITIONER

## 2022-04-20 PROCEDURE — 93798 PHYS/QHP OP CAR RHAB W/ECG: CPT | Performed by: REHABILITATION PRACTITIONER

## 2022-04-25 ENCOUNTER — HOSPITAL ENCOUNTER (OUTPATIENT)
Dept: CARDIAC REHAB | Facility: CLINIC | Age: 62
Discharge: HOME | End: 2022-04-25
Attending: INTERNAL MEDICINE
Payer: COMMERCIAL

## 2022-04-25 PROCEDURE — 93798 PHYS/QHP OP CAR RHAB W/ECG: CPT

## 2022-04-28 ENCOUNTER — HOSPITAL ENCOUNTER (OUTPATIENT)
Dept: CARDIAC REHAB | Facility: CLINIC | Age: 62
Discharge: HOME | End: 2022-04-28
Attending: INTERNAL MEDICINE
Payer: COMMERCIAL

## 2022-04-28 PROCEDURE — 93798 PHYS/QHP OP CAR RHAB W/ECG: CPT

## 2022-04-28 PROCEDURE — 93797 PHYS/QHP OP CAR RHAB WO ECG: CPT | Mod: XU

## 2022-09-29 ENCOUNTER — TELEPHONE (OUTPATIENT)
Dept: CARDIOLOGY | Facility: CLINIC | Age: 62
End: 2022-09-29

## 2022-10-09 ENCOUNTER — HEALTH MAINTENANCE LETTER (OUTPATIENT)
Age: 62
End: 2022-10-09

## 2022-10-12 ENCOUNTER — HOSPITAL ENCOUNTER (OUTPATIENT)
Dept: CARDIOLOGY | Facility: CLINIC | Age: 62
Discharge: HOME | End: 2022-10-12
Attending: INTERNAL MEDICINE | Admitting: INTERNAL MEDICINE
Payer: COMMERCIAL

## 2022-10-12 DIAGNOSIS — I21.4 NSTEMI (NON-ST ELEVATED MYOCARDIAL INFARCTION) (H): ICD-10-CM

## 2022-10-12 LAB — LVEF ECHO: NORMAL

## 2022-10-12 PROCEDURE — 93306 TTE W/DOPPLER COMPLETE: CPT | Mod: 26 | Performed by: INTERNAL MEDICINE

## 2022-10-12 PROCEDURE — 93306 TTE W/DOPPLER COMPLETE: CPT

## 2022-10-18 ENCOUNTER — MYC MEDICAL ADVICE (OUTPATIENT)
Dept: CARDIOLOGY | Facility: CLINIC | Age: 62
End: 2022-10-18

## 2022-10-19 RX ORDER — LOSARTAN POTASSIUM 25 MG/1
12.5 TABLET ORAL DAILY
COMMUNITY
Start: 2022-08-02 | End: 2023-01-05

## 2022-10-31 RX ORDER — ACETAMINOPHEN 500 MG
500 TABLET ORAL EVERY 6 HOURS PRN
Status: ON HOLD | COMMUNITY
End: 2022-11-14

## 2022-10-31 RX ORDER — LOSARTAN POTASSIUM 25 MG/1
12.5 TABLET ORAL DAILY
COMMUNITY
Start: 2022-05-10 | End: 2022-10-31

## 2022-10-31 RX ORDER — DULOXETIN HYDROCHLORIDE 20 MG/1
20 CAPSULE, DELAYED RELEASE ORAL DAILY
COMMUNITY

## 2022-10-31 RX ORDER — NITROGLYCERIN 0.4 MG/1
0.4 TABLET SUBLINGUAL PRN
COMMUNITY
Start: 2022-10-25 | End: 2024-03-28

## 2022-10-31 RX ORDER — HYDROXYZINE HYDROCHLORIDE 25 MG/1
25 TABLET, FILM COATED ORAL PRN
Status: ON HOLD | COMMUNITY
Start: 2022-10-25 | End: 2022-11-14

## 2022-10-31 NOTE — PROGRESS NOTES
10/31/22 1144   Discharge Planning   Concerns to be Addressed no discharge needs identified;denies needs/concerns at this time   Living Arrangements   Is your private residence a single family home or apartment? Single family home   Number of Stairs, Within Home, Primary greater than 10 stairs   Stair Railings, Within Home, Primary railings safe and in good condition   Once home, are you able to live on one level? No   Which level? Lower Level   Which rooms are not on the main level? Living Room;Kitchen   Bathroom Shower/Tub Walk-in shower   Support System   Do you have someone available to stay with you one or two nights once you are home? Yes   Medical Clearance   It is recommended that you call and check with any specialty providers before surgery to see if you need surgical clearance.  Do you see any specialty providers outside of your primary care provider? No  (echo done beginning of October)   Blood   Known bleeding disorder or coagulopathy? No   Does the patient have any Yazidism/cultural preferences related to blood products? No   Education   Has the patient scheduled or completed pre-op total joint education, either in class or online, in the last 12 months? Yes   Patient attended total joint pre-op class/received pre-op teaching  online

## 2022-11-01 ENCOUNTER — MYC MEDICAL ADVICE (OUTPATIENT)
Dept: CARDIOLOGY | Facility: CLINIC | Age: 62
End: 2022-11-01

## 2022-11-10 NOTE — PHARMACY-ADMISSION MEDICATION HISTORY
Medication reconciliation interview completed by pre-admitting nurse, reviewed by pharmacy. No further clarifications needed.     Prior to Admission medications    Medication Sig Last Dose Taking? Auth Provider Long Term End Date   acetaminophen (TYLENOL) 500 MG tablet Take 500 mg by mouth every 6 hours as needed  Yes Reported, Patient No    aspirin (ASA) 81 MG EC tablet Take 1 tablet (81 mg) by mouth daily  Yes Karen Telles CNP     atorvastatin (LIPITOR) 40 MG tablet Take 1 tablet (40 mg) by mouth daily  Yes Karen Telles CNP Yes    cetirizine (ZYRTEC) 10 MG tablet Take 10 mg by mouth daily  Yes Unknown, Entered By History     cyanocobalamin (VITAMIN B-12) 1000 MCG tablet Take 1,000 mcg by mouth daily  Yes Unknown, Entered By History     DULoxetine (CYMBALTA) 20 MG capsule Take 20 mg by mouth daily  Yes Reported, Patient Yes    hydrOXYzine (ATARAX) 25 MG tablet Take 25 mg by mouth as needed  Yes Reported, Patient     losartan (COZAAR) 25 MG tablet Take 12.5 mg by mouth daily  Yes Reported, Patient Yes    nitroGLYcerin (NITROSTAT) 0.4 MG sublingual tablet Place 0.4 mg under the tongue as needed  Yes Reported, Patient Yes

## 2022-11-14 ENCOUNTER — ANESTHESIA EVENT (OUTPATIENT)
Dept: SURGERY | Facility: CLINIC | Age: 62
End: 2022-11-14
Payer: COMMERCIAL

## 2022-11-14 ENCOUNTER — APPOINTMENT (OUTPATIENT)
Dept: PHYSICAL THERAPY | Facility: CLINIC | Age: 62
End: 2022-11-14
Attending: ORTHOPAEDIC SURGERY
Payer: COMMERCIAL

## 2022-11-14 ENCOUNTER — ANESTHESIA (OUTPATIENT)
Dept: SURGERY | Facility: CLINIC | Age: 62
End: 2022-11-14
Payer: COMMERCIAL

## 2022-11-14 ENCOUNTER — APPOINTMENT (OUTPATIENT)
Dept: GENERAL RADIOLOGY | Facility: CLINIC | Age: 62
End: 2022-11-14
Attending: PHYSICIAN ASSISTANT
Payer: COMMERCIAL

## 2022-11-14 ENCOUNTER — HOSPITAL ENCOUNTER (OUTPATIENT)
Facility: CLINIC | Age: 62
Discharge: HOME | End: 2022-11-15
Attending: ORTHOPAEDIC SURGERY | Admitting: ORTHOPAEDIC SURGERY
Payer: COMMERCIAL

## 2022-11-14 DIAGNOSIS — Z96.651 TOTAL KNEE REPLACEMENT STATUS, RIGHT: Primary | ICD-10-CM

## 2022-11-14 LAB
ATRIAL RATE - MUSE: 69 BPM
DIASTOLIC BLOOD PRESSURE - MUSE: NORMAL MMHG
INTERPRETATION ECG - MUSE: NORMAL
P AXIS - MUSE: 59 DEGREES
PR INTERVAL - MUSE: 138 MS
QRS DURATION - MUSE: 82 MS
QT - MUSE: 396 MS
QTC - MUSE: 424 MS
R AXIS - MUSE: -5 DEGREES
SYSTOLIC BLOOD PRESSURE - MUSE: NORMAL MMHG
T AXIS - MUSE: 4 DEGREES
VENTRICULAR RATE- MUSE: 69 BPM

## 2022-11-14 PROCEDURE — 250N000009 HC RX 250: Performed by: ANESTHESIOLOGY

## 2022-11-14 PROCEDURE — C1713 ANCHOR/SCREW BN/BN,TIS/BN: HCPCS | Performed by: ORTHOPAEDIC SURGERY

## 2022-11-14 PROCEDURE — 360N000077 HC SURGERY LEVEL 4, PER MIN: Performed by: ORTHOPAEDIC SURGERY

## 2022-11-14 PROCEDURE — 250N000009 HC RX 250: Performed by: ORTHOPAEDIC SURGERY

## 2022-11-14 PROCEDURE — 258N000003 HC RX IP 258 OP 636: Performed by: NURSE ANESTHETIST, CERTIFIED REGISTERED

## 2022-11-14 PROCEDURE — 258N000001 HC RX 258: Performed by: ORTHOPAEDIC SURGERY

## 2022-11-14 PROCEDURE — 93010 ELECTROCARDIOGRAM REPORT: CPT | Performed by: INTERNAL MEDICINE

## 2022-11-14 PROCEDURE — 250N000013 HC RX MED GY IP 250 OP 250 PS 637: Performed by: HOSPITALIST

## 2022-11-14 PROCEDURE — C1776 JOINT DEVICE (IMPLANTABLE): HCPCS | Performed by: ORTHOPAEDIC SURGERY

## 2022-11-14 PROCEDURE — 250N000009 HC RX 250: Performed by: NURSE ANESTHETIST, CERTIFIED REGISTERED

## 2022-11-14 PROCEDURE — 97161 PT EVAL LOW COMPLEX 20 MIN: CPT | Mod: GP | Performed by: PHYSICAL THERAPIST

## 2022-11-14 PROCEDURE — 250N000013 HC RX MED GY IP 250 OP 250 PS 637: Performed by: PHYSICIAN ASSISTANT

## 2022-11-14 PROCEDURE — 999N000065 XR KNEE PORT RIGHT 1/2 VIEWS: Mod: RT

## 2022-11-14 PROCEDURE — 258N000003 HC RX IP 258 OP 636: Performed by: ANESTHESIOLOGY

## 2022-11-14 PROCEDURE — 258N000003 HC RX IP 258 OP 636: Performed by: PHYSICIAN ASSISTANT

## 2022-11-14 PROCEDURE — 250N000013 HC RX MED GY IP 250 OP 250 PS 637: Performed by: ANESTHESIOLOGY

## 2022-11-14 PROCEDURE — 250N000011 HC RX IP 250 OP 636: Performed by: PHYSICIAN ASSISTANT

## 2022-11-14 PROCEDURE — 97116 GAIT TRAINING THERAPY: CPT | Mod: GP | Performed by: PHYSICAL THERAPIST

## 2022-11-14 PROCEDURE — 272N000001 HC OR GENERAL SUPPLY STERILE: Performed by: ORTHOPAEDIC SURGERY

## 2022-11-14 PROCEDURE — 250N000011 HC RX IP 250 OP 636: Performed by: ORTHOPAEDIC SURGERY

## 2022-11-14 PROCEDURE — 999N000141 HC STATISTIC PRE-PROCEDURE NURSING ASSESSMENT: Performed by: ORTHOPAEDIC SURGERY

## 2022-11-14 PROCEDURE — 97110 THERAPEUTIC EXERCISES: CPT | Mod: GP | Performed by: PHYSICAL THERAPIST

## 2022-11-14 PROCEDURE — 710N000009 HC RECOVERY PHASE 1, LEVEL 1, PER MIN: Performed by: ORTHOPAEDIC SURGERY

## 2022-11-14 PROCEDURE — 250N000013 HC RX MED GY IP 250 OP 250 PS 637: Performed by: ORTHOPAEDIC SURGERY

## 2022-11-14 PROCEDURE — 250N000011 HC RX IP 250 OP 636: Performed by: NURSE ANESTHETIST, CERTIFIED REGISTERED

## 2022-11-14 PROCEDURE — 250N000009 HC RX 250: Performed by: PHYSICIAN ASSISTANT

## 2022-11-14 PROCEDURE — 370N000017 HC ANESTHESIA TECHNICAL FEE, PER MIN: Performed by: ORTHOPAEDIC SURGERY

## 2022-11-14 DEVICE — IMPLANTABLE DEVICE: Type: IMPLANTABLE DEVICE | Site: KNEE | Status: FUNCTIONAL

## 2022-11-14 DEVICE — IMP PATELLA ZIM KNEE ALL POLLY 32MM 42-5400-000-32: Type: IMPLANTABLE DEVICE | Site: KNEE | Status: FUNCTIONAL

## 2022-11-14 DEVICE — BONE CEMENT SIMPLEX FULL DOSE 6191-1-001: Type: IMPLANTABLE DEVICE | Site: KNEE | Status: FUNCTIONAL

## 2022-11-14 DEVICE — IMPLANTABLE DEVICE
Type: IMPLANTABLE DEVICE | Site: KNEE | Status: FUNCTIONAL
Brand: PERSONA®

## 2022-11-14 DEVICE — IMPLANTABLE DEVICE
Type: IMPLANTABLE DEVICE | Site: KNEE | Status: FUNCTIONAL
Brand: PERSONA® NATURAL TIBIA®

## 2022-11-14 RX ORDER — LIDOCAINE 40 MG/G
CREAM TOPICAL
Status: DISCONTINUED | OUTPATIENT
Start: 2022-11-14 | End: 2022-11-15 | Stop reason: HOSPADM

## 2022-11-14 RX ORDER — CALCIUM CARBONATE 500 MG/1
500 TABLET, CHEWABLE ORAL 4 TIMES DAILY PRN
Status: DISCONTINUED | OUTPATIENT
Start: 2022-11-14 | End: 2022-11-15 | Stop reason: HOSPADM

## 2022-11-14 RX ORDER — ALBUTEROL SULFATE 0.83 MG/ML
2.5 SOLUTION RESPIRATORY (INHALATION) EVERY 4 HOURS PRN
Status: DISCONTINUED | OUTPATIENT
Start: 2022-11-14 | End: 2022-11-14 | Stop reason: HOSPADM

## 2022-11-14 RX ORDER — POLYETHYLENE GLYCOL 3350 17 G/17G
17 POWDER, FOR SOLUTION ORAL DAILY
Status: DISCONTINUED | OUTPATIENT
Start: 2022-11-15 | End: 2022-11-15 | Stop reason: HOSPADM

## 2022-11-14 RX ORDER — ACETAMINOPHEN 325 MG/1
650 TABLET ORAL EVERY 4 HOURS PRN
Status: DISCONTINUED | OUTPATIENT
Start: 2022-11-17 | End: 2022-11-14

## 2022-11-14 RX ORDER — LIDOCAINE HYDROCHLORIDE 10 MG/ML
INJECTION, SOLUTION INFILTRATION; PERINEURAL PRN
Status: DISCONTINUED | OUTPATIENT
Start: 2022-11-14 | End: 2022-11-14

## 2022-11-14 RX ORDER — HYDROXYZINE HYDROCHLORIDE 25 MG/1
25 TABLET, FILM COATED ORAL EVERY 6 HOURS PRN
Status: DISCONTINUED | OUTPATIENT
Start: 2022-11-14 | End: 2022-11-15 | Stop reason: HOSPADM

## 2022-11-14 RX ORDER — TRANEXAMIC ACID 650 MG/1
1950 TABLET ORAL ONCE
Status: COMPLETED | OUTPATIENT
Start: 2022-11-14 | End: 2022-11-14

## 2022-11-14 RX ORDER — ACETAMINOPHEN 325 MG/1
650 TABLET ORAL EVERY 4 HOURS PRN
Qty: 100 TABLET | Refills: 0 | Status: SHIPPED | OUTPATIENT
Start: 2022-11-14

## 2022-11-14 RX ORDER — VANCOMYCIN HYDROCHLORIDE 1 G/20ML
INJECTION, POWDER, LYOPHILIZED, FOR SOLUTION INTRAVENOUS PRN
Status: DISCONTINUED | OUTPATIENT
Start: 2022-11-14 | End: 2022-11-14 | Stop reason: HOSPADM

## 2022-11-14 RX ORDER — HYDRALAZINE HYDROCHLORIDE 20 MG/ML
2.5-5 INJECTION INTRAMUSCULAR; INTRAVENOUS EVERY 10 MIN PRN
Status: DISCONTINUED | OUTPATIENT
Start: 2022-11-14 | End: 2022-11-14 | Stop reason: HOSPADM

## 2022-11-14 RX ORDER — HYDROMORPHONE HCL IN WATER/PF 6 MG/30 ML
0.4 PATIENT CONTROLLED ANALGESIA SYRINGE INTRAVENOUS
Status: DISCONTINUED | OUTPATIENT
Start: 2022-11-14 | End: 2022-11-15 | Stop reason: HOSPADM

## 2022-11-14 RX ORDER — ONDANSETRON 4 MG/1
4 TABLET, ORALLY DISINTEGRATING ORAL EVERY 30 MIN PRN
Status: DISCONTINUED | OUTPATIENT
Start: 2022-11-14 | End: 2022-11-14 | Stop reason: HOSPADM

## 2022-11-14 RX ORDER — ACETAMINOPHEN 325 MG/1
650 TABLET ORAL EVERY 4 HOURS PRN
Status: DISCONTINUED | OUTPATIENT
Start: 2022-11-14 | End: 2022-11-15 | Stop reason: HOSPADM

## 2022-11-14 RX ORDER — CEFAZOLIN SODIUM 2 G/100ML
2 INJECTION, SOLUTION INTRAVENOUS EVERY 8 HOURS
Status: COMPLETED | OUTPATIENT
Start: 2022-11-14 | End: 2022-11-14

## 2022-11-14 RX ORDER — BUPIVACAINE HYDROCHLORIDE 5 MG/ML
INJECTION, SOLUTION EPIDURAL; INTRACAUDAL
Status: COMPLETED | OUTPATIENT
Start: 2022-11-14 | End: 2022-11-14

## 2022-11-14 RX ORDER — HYDROMORPHONE HCL IN WATER/PF 6 MG/30 ML
0.2 PATIENT CONTROLLED ANALGESIA SYRINGE INTRAVENOUS
Status: DISCONTINUED | OUTPATIENT
Start: 2022-11-14 | End: 2022-11-15 | Stop reason: HOSPADM

## 2022-11-14 RX ORDER — OXYCODONE HYDROCHLORIDE 5 MG/1
5-10 TABLET ORAL EVERY 4 HOURS PRN
Qty: 30 TABLET | Refills: 0 | Status: SHIPPED | OUTPATIENT
Start: 2022-11-14 | End: 2023-01-05

## 2022-11-14 RX ORDER — AMOXICILLIN 250 MG
1-2 CAPSULE ORAL 2 TIMES DAILY
Qty: 30 TABLET | Refills: 0 | Status: SHIPPED | OUTPATIENT
Start: 2022-11-14 | End: 2023-01-05

## 2022-11-14 RX ORDER — CEFAZOLIN SODIUM/WATER 2 G/20 ML
2 SYRINGE (ML) INTRAVENOUS
Status: COMPLETED | OUTPATIENT
Start: 2022-11-14 | End: 2022-11-14

## 2022-11-14 RX ORDER — LIDOCAINE 40 MG/G
CREAM TOPICAL
Status: DISCONTINUED | OUTPATIENT
Start: 2022-11-14 | End: 2022-11-14 | Stop reason: HOSPADM

## 2022-11-14 RX ORDER — ONDANSETRON 2 MG/ML
4 INJECTION INTRAMUSCULAR; INTRAVENOUS EVERY 6 HOURS PRN
Status: DISCONTINUED | OUTPATIENT
Start: 2022-11-14 | End: 2022-11-15 | Stop reason: HOSPADM

## 2022-11-14 RX ORDER — ONDANSETRON 2 MG/ML
INJECTION INTRAMUSCULAR; INTRAVENOUS PRN
Status: DISCONTINUED | OUTPATIENT
Start: 2022-11-14 | End: 2022-11-14

## 2022-11-14 RX ORDER — OXYCODONE HYDROCHLORIDE 5 MG/1
5 TABLET ORAL EVERY 4 HOURS PRN
Status: DISCONTINUED | OUTPATIENT
Start: 2022-11-14 | End: 2022-11-14 | Stop reason: HOSPADM

## 2022-11-14 RX ORDER — HYDROMORPHONE HCL IN WATER/PF 6 MG/30 ML
0.4 PATIENT CONTROLLED ANALGESIA SYRINGE INTRAVENOUS EVERY 5 MIN PRN
Status: DISCONTINUED | OUTPATIENT
Start: 2022-11-14 | End: 2022-11-14 | Stop reason: HOSPADM

## 2022-11-14 RX ORDER — DEXAMETHASONE SODIUM PHOSPHATE 4 MG/ML
INJECTION, SOLUTION INTRA-ARTICULAR; INTRALESIONAL; INTRAMUSCULAR; INTRAVENOUS; SOFT TISSUE PRN
Status: DISCONTINUED | OUTPATIENT
Start: 2022-11-14 | End: 2022-11-14

## 2022-11-14 RX ORDER — CEFAZOLIN SODIUM/WATER 2 G/20 ML
2 SYRINGE (ML) INTRAVENOUS SEE ADMIN INSTRUCTIONS
Status: DISCONTINUED | OUTPATIENT
Start: 2022-11-14 | End: 2022-11-14 | Stop reason: HOSPADM

## 2022-11-14 RX ORDER — SODIUM CHLORIDE, SODIUM LACTATE, POTASSIUM CHLORIDE, CALCIUM CHLORIDE 600; 310; 30; 20 MG/100ML; MG/100ML; MG/100ML; MG/100ML
INJECTION, SOLUTION INTRAVENOUS CONTINUOUS
Status: DISCONTINUED | OUTPATIENT
Start: 2022-11-14 | End: 2022-11-14 | Stop reason: HOSPADM

## 2022-11-14 RX ORDER — FENTANYL CITRATE 50 UG/ML
INJECTION, SOLUTION INTRAMUSCULAR; INTRAVENOUS PRN
Status: DISCONTINUED | OUTPATIENT
Start: 2022-11-14 | End: 2022-11-14

## 2022-11-14 RX ORDER — TRANEXAMIC ACID 10 MG/ML
1 INJECTION, SOLUTION INTRAVENOUS ONCE
Status: COMPLETED | OUTPATIENT
Start: 2022-11-14 | End: 2022-11-14

## 2022-11-14 RX ORDER — ACETAMINOPHEN 325 MG/1
975 TABLET ORAL ONCE
Status: COMPLETED | OUTPATIENT
Start: 2022-11-14 | End: 2022-11-14

## 2022-11-14 RX ORDER — FENTANYL CITRATE 50 UG/ML
50 INJECTION, SOLUTION INTRAMUSCULAR; INTRAVENOUS EVERY 5 MIN PRN
Status: DISCONTINUED | OUTPATIENT
Start: 2022-11-14 | End: 2022-11-14 | Stop reason: HOSPADM

## 2022-11-14 RX ORDER — NALOXONE HYDROCHLORIDE 0.4 MG/ML
0.2 INJECTION, SOLUTION INTRAMUSCULAR; INTRAVENOUS; SUBCUTANEOUS
Status: DISCONTINUED | OUTPATIENT
Start: 2022-11-14 | End: 2022-11-15 | Stop reason: HOSPADM

## 2022-11-14 RX ORDER — PROCHLORPERAZINE MALEATE 5 MG
10 TABLET ORAL EVERY 6 HOURS PRN
Status: DISCONTINUED | OUTPATIENT
Start: 2022-11-14 | End: 2022-11-15 | Stop reason: HOSPADM

## 2022-11-14 RX ORDER — LABETALOL HYDROCHLORIDE 5 MG/ML
10 INJECTION, SOLUTION INTRAVENOUS
Status: DISCONTINUED | OUTPATIENT
Start: 2022-11-14 | End: 2022-11-14 | Stop reason: HOSPADM

## 2022-11-14 RX ORDER — BISACODYL 10 MG
10 SUPPOSITORY, RECTAL RECTAL DAILY PRN
Status: DISCONTINUED | OUTPATIENT
Start: 2022-11-14 | End: 2022-11-15 | Stop reason: HOSPADM

## 2022-11-14 RX ORDER — PROPOFOL 10 MG/ML
INJECTION, EMULSION INTRAVENOUS CONTINUOUS PRN
Status: DISCONTINUED | OUTPATIENT
Start: 2022-11-14 | End: 2022-11-14

## 2022-11-14 RX ORDER — BUPIVACAINE HYDROCHLORIDE AND EPINEPHRINE 2.5; 5 MG/ML; UG/ML
INJECTION, SOLUTION INFILTRATION; PERINEURAL
Status: COMPLETED | OUTPATIENT
Start: 2022-11-14 | End: 2022-11-14

## 2022-11-14 RX ORDER — ATORVASTATIN CALCIUM 40 MG/1
40 TABLET, FILM COATED ORAL AT BEDTIME
Status: DISCONTINUED | OUTPATIENT
Start: 2022-11-14 | End: 2022-11-15 | Stop reason: HOSPADM

## 2022-11-14 RX ORDER — POLYETHYLENE GLYCOL 3350 17 G/17G
1 POWDER, FOR SOLUTION ORAL DAILY
Qty: 10 PACKET | Refills: 0 | Status: SHIPPED | OUTPATIENT
Start: 2022-11-14

## 2022-11-14 RX ORDER — IBUPROFEN 600 MG/1
600 TABLET, FILM COATED ORAL EVERY 6 HOURS PRN
Qty: 30 TABLET | Refills: 0 | Status: SHIPPED | OUTPATIENT
Start: 2022-11-14 | End: 2023-01-05

## 2022-11-14 RX ORDER — HYDROXYZINE HYDROCHLORIDE 25 MG/1
25 TABLET, FILM COATED ORAL EVERY 6 HOURS PRN
Qty: 30 TABLET | Refills: 0 | Status: SHIPPED | OUTPATIENT
Start: 2022-11-14

## 2022-11-14 RX ORDER — ASPIRIN 81 MG/1
81 TABLET ORAL DAILY
COMMUNITY

## 2022-11-14 RX ORDER — OXYCODONE HYDROCHLORIDE 5 MG/1
10 TABLET ORAL EVERY 4 HOURS PRN
Status: DISCONTINUED | OUTPATIENT
Start: 2022-11-14 | End: 2022-11-15 | Stop reason: HOSPADM

## 2022-11-14 RX ORDER — ONDANSETRON 2 MG/ML
4 INJECTION INTRAMUSCULAR; INTRAVENOUS EVERY 30 MIN PRN
Status: DISCONTINUED | OUTPATIENT
Start: 2022-11-14 | End: 2022-11-14 | Stop reason: HOSPADM

## 2022-11-14 RX ORDER — AMOXICILLIN 250 MG
1 CAPSULE ORAL 2 TIMES DAILY
Status: DISCONTINUED | OUTPATIENT
Start: 2022-11-14 | End: 2022-11-15 | Stop reason: HOSPADM

## 2022-11-14 RX ORDER — ONDANSETRON 4 MG/1
4 TABLET, ORALLY DISINTEGRATING ORAL EVERY 6 HOURS PRN
Status: DISCONTINUED | OUTPATIENT
Start: 2022-11-14 | End: 2022-11-15 | Stop reason: HOSPADM

## 2022-11-14 RX ORDER — NALOXONE HYDROCHLORIDE 0.4 MG/ML
0.4 INJECTION, SOLUTION INTRAMUSCULAR; INTRAVENOUS; SUBCUTANEOUS
Status: DISCONTINUED | OUTPATIENT
Start: 2022-11-14 | End: 2022-11-15 | Stop reason: HOSPADM

## 2022-11-14 RX ORDER — SODIUM CHLORIDE, SODIUM LACTATE, POTASSIUM CHLORIDE, CALCIUM CHLORIDE 600; 310; 30; 20 MG/100ML; MG/100ML; MG/100ML; MG/100ML
INJECTION, SOLUTION INTRAVENOUS CONTINUOUS
Status: DISCONTINUED | OUTPATIENT
Start: 2022-11-14 | End: 2022-11-15 | Stop reason: HOSPADM

## 2022-11-14 RX ORDER — OXYCODONE HYDROCHLORIDE 5 MG/1
5 TABLET ORAL EVERY 4 HOURS PRN
Status: DISCONTINUED | OUTPATIENT
Start: 2022-11-14 | End: 2022-11-15 | Stop reason: HOSPADM

## 2022-11-14 RX ADMIN — PHENYLEPHRINE HYDROCHLORIDE 50 MCG: 10 INJECTION INTRAVENOUS at 08:53

## 2022-11-14 RX ADMIN — TRANEXAMIC ACID 1950 MG: 650 TABLET ORAL at 07:18

## 2022-11-14 RX ADMIN — CEFAZOLIN SODIUM 2 G: 2 INJECTION, SOLUTION INTRAVENOUS at 23:15

## 2022-11-14 RX ADMIN — CEFAZOLIN SODIUM 2 G: 2 INJECTION, SOLUTION INTRAVENOUS at 16:20

## 2022-11-14 RX ADMIN — BUPIVACAINE HYDROCHLORIDE AND EPINEPHRINE BITARTRATE 20 ML: 2.5; .005 INJECTION, SOLUTION INFILTRATION; PERINEURAL at 08:15

## 2022-11-14 RX ADMIN — OXYCODONE HYDROCHLORIDE 5 MG: 5 TABLET ORAL at 14:19

## 2022-11-14 RX ADMIN — PHENYLEPHRINE HYDROCHLORIDE 150 MCG: 10 INJECTION INTRAVENOUS at 09:13

## 2022-11-14 RX ADMIN — TRANEXAMIC ACID 1 G: 10 INJECTION, SOLUTION INTRAVENOUS at 09:27

## 2022-11-14 RX ADMIN — OXYCODONE HYDROCHLORIDE 5 MG: 5 TABLET ORAL at 22:40

## 2022-11-14 RX ADMIN — PHENYLEPHRINE HYDROCHLORIDE 100 MCG: 10 INJECTION INTRAVENOUS at 09:31

## 2022-11-14 RX ADMIN — PROPOFOL 60 MCG/KG/MIN: 10 INJECTION, EMULSION INTRAVENOUS at 08:29

## 2022-11-14 RX ADMIN — ONDANSETRON HYDROCHLORIDE 4 MG: 2 INJECTION, SOLUTION INTRAVENOUS at 08:38

## 2022-11-14 RX ADMIN — ACETAMINOPHEN 650 MG: 325 TABLET, FILM COATED ORAL at 16:19

## 2022-11-14 RX ADMIN — FENTANYL CITRATE 25 MCG: 50 INJECTION, SOLUTION INTRAMUSCULAR; INTRAVENOUS at 08:39

## 2022-11-14 RX ADMIN — HYDROXYZINE HYDROCHLORIDE 25 MG: 25 TABLET, FILM COATED ORAL at 22:40

## 2022-11-14 RX ADMIN — ASPIRIN 325 MG: 325 TABLET, COATED ORAL at 18:26

## 2022-11-14 RX ADMIN — OXYCODONE HYDROCHLORIDE 5 MG: 5 TABLET ORAL at 18:26

## 2022-11-14 RX ADMIN — DEXAMETHASONE SODIUM PHOSPHATE 4 MG: 4 INJECTION, SOLUTION INTRA-ARTICULAR; INTRALESIONAL; INTRAMUSCULAR; INTRAVENOUS; SOFT TISSUE at 08:38

## 2022-11-14 RX ADMIN — PHENYLEPHRINE HYDROCHLORIDE 100 MCG: 10 INJECTION INTRAVENOUS at 09:04

## 2022-11-14 RX ADMIN — ACETAMINOPHEN 650 MG: 325 TABLET, FILM COATED ORAL at 20:25

## 2022-11-14 RX ADMIN — LIDOCAINE HYDROCHLORIDE 25 MG: 10 INJECTION, SOLUTION INFILTRATION; PERINEURAL at 08:32

## 2022-11-14 RX ADMIN — FENTANYL CITRATE 25 MCG: 50 INJECTION, SOLUTION INTRAMUSCULAR; INTRAVENOUS at 08:34

## 2022-11-14 RX ADMIN — MIDAZOLAM 2 MG: 1 INJECTION INTRAMUSCULAR; INTRAVENOUS at 08:25

## 2022-11-14 RX ADMIN — SODIUM CHLORIDE, POTASSIUM CHLORIDE, SODIUM LACTATE AND CALCIUM CHLORIDE: 600; 310; 30; 20 INJECTION, SOLUTION INTRAVENOUS at 07:52

## 2022-11-14 RX ADMIN — FENTANYL CITRATE 50 MCG: 50 INJECTION, SOLUTION INTRAMUSCULAR; INTRAVENOUS at 08:25

## 2022-11-14 RX ADMIN — PHENYLEPHRINE HYDROCHLORIDE 100 MCG: 10 INJECTION INTRAVENOUS at 09:23

## 2022-11-14 RX ADMIN — HYDROXYZINE HYDROCHLORIDE 25 MG: 25 TABLET, FILM COATED ORAL at 16:19

## 2022-11-14 RX ADMIN — ATORVASTATIN CALCIUM 40 MG: 40 TABLET, FILM COATED ORAL at 23:14

## 2022-11-14 RX ADMIN — BUPIVACAINE HYDROCHLORIDE 2 ML: 5 INJECTION, SOLUTION EPIDURAL; INTRACAUDAL at 08:25

## 2022-11-14 RX ADMIN — ACETAMINOPHEN 975 MG: 325 TABLET ORAL at 07:17

## 2022-11-14 RX ADMIN — Medication 2 G: at 08:22

## 2022-11-14 ASSESSMENT — ACTIVITIES OF DAILY LIVING (ADL)
ADLS_ACUITY_SCORE: 20

## 2022-11-14 NOTE — PROGRESS NOTES
Bladder scan = 826. Pt was able to void on bedpan. PVR after void = 568. Straight cath completed for 500 mL clear, straw urine.

## 2022-11-14 NOTE — OP NOTE
Procedure Date: 11/14/2022    PREOPERATIVE DIAGNOSIS:  Osteoarthritis, right knee (valgus).    POSTOPERATIVE DIAGNOSIS:  Osteoarthritis, right knee (valgus).    PROCEDURE PERFORMED:  Right total knee arthroplasty.    SURGEON:  Yinka Singh MD    ASSISTANT:  Nubia Sandy PA-C    ANESTHESIA:  Spinal with adductor block.    ESTIMATED BLOOD LOSS:  50 mL    TOURNIQUET TIME:  35 minutes.    COMPLICATIONS:  None.    DESCRIPTION OF PROCEDURE:  The patient was taken to the operating room where after administration of antibiotic prophylaxis, tranexamic acid and sterile prep and drape, the leg was exsanguinated and an anterior incision was made, followed by a medial arthrotomy with no medial release.  An intramedullary 5-degree guide was used to the anterior referencing at 5 degrees of external rotation, which best matched the epicondylar axis given the advanced lateral compartment arthritis.  A box cut was made for PCL substitution.  Retractors were carefully placed about the proximal tibia and a cut was made perpendicular to mechanical axis of the tibia, removing 3-4 mm of bone from each side.  Tibial rotation was matched to the junction of the medial and middle thirds of the tubercle and to the femur, 9 mm was then resected from the undersurface of a 23 mm patella and sized appropriately.  Posterior osteophytes were removed under direct vision and with the knee in hyperflexion and a capsular injection was performed.  Gaps were equal apart from the flexion gap medially being slightly more tight.    After copious lavage and drying, Simplex cementing was performed for a Kevin Persona narrow size 8 femur, size E tibia, 11 mm polyethylene insert, and a 32 mm patellar button.  Range of motion, balance and tracking all appeared excellent with no laxity at any point of the flexion arc.  She had 10 degrees of native recurvatum and there were 5 degrees with no pressure on her knee itself.  One gram of vancomycin powder was  placed in the wound after Betadine lavage and a layered anatomic closure was accomplished.  There were no complications.    Yinka Singh MD        D: 2022   T: 2022   MT: NATALYA    Name:     LONG BAH  MRN:      [id number]        Account:        [id number]   :      1960           Procedure Date: 2022     Document: [id number]

## 2022-11-14 NOTE — PROGRESS NOTES
11/14/22 1751   Appointment Info   Signing Clinician's Name / Credentials (PT) Jean Mcdonough DPT   Living Environment   Living Environment Comments Pt lives in home with spouse, ~7 stairs to manage downstairs with unilateral railing. Ind with mobility at baseline.   Self-Care   Usual Activity Tolerance good   Current Activity Tolerance moderate   Equipment Currently Used at Home walker, standard   Fall history within last six months no   General Information   Onset of Illness/Injury or Date of Surgery 11/14/22   Referring Physician Nubia Sandy PA-C   Patient/Family Therapy Goals Statement (PT) To improve mobility   Pertinent History of Current Problem (include personal factors and/or comorbidities that impact the POC) Pt is a 62 year old female POD0 s/p R TKA.   Existing Precautions/Restrictions weight bearing   Weight-Bearing Status - RLE weight-bearing as tolerated   Cognition   Affect/Mental Status (Cognition) WFL   Orientation Status (Cognition) oriented x 4   Follows Commands (Cognition) WFL   Pain Assessment   Patient Currently in Pain No   Range of Motion (ROM)   ROM Comment R knee flexion decreased ~35 degrees   Strength (Manual Muscle Testing)   Strength Comments able to complete B SLR   Bed Mobility   Comment, (Bed Mobility) SBA supine <> sit with HOB raised   Transfers   Comment, (Transfers) CGA sit <> stand with FWW   Gait/Stairs (Locomotion)   Distance in Feet (Required for LE Total Joints) 10   Distance in Feet (Gait) 130   Pattern (Gait) step-to   Deviations/Abnormal Patterns (Gait) antalgic;base of support, wide;gait speed decreased;weight shifting decreased   Comment, (Gait/Stairs) CGA with FWW   Balance   Balance Comments good sitting; fair+ standing with FWW   Clinical Impression   Criteria for Skilled Therapeutic Intervention Yes, treatment indicated   PT Diagnosis (PT) impaired functional mobility   Influenced by the following impairments decreased R knee ROM; impaired  balance, decreased R knee strength   Functional limitations due to impairments impaired bed mobility, transfers, ambulation, stairs   Clinical Presentation (PT Evaluation Complexity) Stable/Uncomplicated   Clinical Presentation Rationale Pt is medically stable   Clinical Decision Making (Complexity) low complexity   Planned Therapy Interventions (PT) balance training;bed mobility training;cryotherapy;gait training;home exercise program;neuromuscular re-education;patient/family education;ROM (range of motion);stair training;strengthening;transfer training   Anticipated Equipment Needs at Discharge (PT) walker, standard   Risk & Benefits of therapy have been explained evaluation/treatment results reviewed;care plan/treatment goals reviewed;risks/benefits reviewed;current/potential barriers reviewed;participants included;participants voiced agreement with care plan;patient;spouse/significant other   PT Total Evaluation Time   PT Eval, Low Complexity Minutes (14235) 8   Plan of Care Review   Plan of Care Reviewed With patient;spouse   Physical Therapy Goals   PT Frequency Daily   PT Predicted Duration/Target Date for Goal Attainment 11/15/22   PT Goals Bed Mobility;Transfers;Gait;Stairs   PT: Bed Mobility Modified independent;Supine to/from sit   PT: Transfers Supervision/stand-by assist;Sit to/from stand;Assistive device   PT: Gait Supervision/stand-by assist;Assistive device;150 feet   PT: Stairs Minimal assist;7 stairs;Rail on left   Interventions   Interventions Quick Adds Gait Training;Therapeutic Activity;Therapeutic Procedure   Therapeutic Procedure/Exercise   Ther. Procedure: strength, endurance, ROM, flexibillity Minutes (35774) 8   Symptoms Noted During/After Treatment increased pain   Treatment Detail/Skilled Intervention Instructed pt in the following supine strengthening exercises to assist with ease of performing functional mobility x8 each. AP, QS, heel slides, SLR. Cuing provided for technique,  prescription, and pacing of exercises for increased muscle control/activation.   Therapeutic Activity   Therapeutic Activities: dynamic activities to improve functional performance Minutes (70543) 5   Symptoms Noted During/After Treatment Increased pain   Treatment Detail/Skilled Intervention Pt supine, willing to participate in PT. Education provided about WBAT and knee contracture prevention. Supine to sit with SBA, HOB raised. No c/o dizziness. Sit to stand with FWW and CGA. Pregait activities performed without any instability, walker height modified for improved UE support. Pt requiring cuing for controlled descent to sitting following mobility. SBA sit to supine transfer. Ice pack applied to R knee.  All needs in place.   Gait Training   Gait Training Minutes (25742) 10   Symptoms Noted During/After Treatment (Gait Training) increased pain   Treatment Detail/Skilled Intervention Instructed pt to ambulate with FWW and CGA. VC for step through reciprocal patterning, B heel strike and increased hip ext for improved upright posturing. Pt able to modify and improve as distance increased.   PT Discharge Planning   PT Plan progress transfers, ambulation; trial stairs with unilateral railing; issue HEP   PT Rationale for DC Rec defer to ortho- anticipate that patient will be SBA with all mobility and use of FWW for support at discharge.   PT Brief overview of current status Ax1 with FWW   Total Session Time   Timed Code Treatment Minutes 23   Total Session Time (sum of timed and untimed services) 31

## 2022-11-14 NOTE — ANESTHESIA PREPROCEDURE EVALUATION
Anesthesia Pre-Procedure Evaluation    Patient: Terese Gonzalez   MRN: [id number] : 1960        Procedure : Procedure(s):  Right total knee arthroplasty(Spinal with adductor canal block)          Past Medical History:   Diagnosis Date     Arthritis     knee     Heart attack (H)      PONV (postoperative nausea and vomiting)       Past Surgical History:   Procedure Laterality Date     CV CORONARY ANGIOGRAM N/A 2021    Procedure: Coronary Angiogram;  Surgeon: Raymundo Cope MD;  Location: [facility]     CV LEFT HEART CATH N/A 2021    Procedure: Left Heart Cath;  Surgeon: Raymundo Cope MD;  Location: [facility]      Allergies   Allergen Reactions     Adhesive Tape Rash      Social History     Tobacco Use     Smoking status: Never     Smokeless tobacco: Never   Substance Use Topics     Alcohol use: Yes     Comment: rarely      Wt Readings from Last 1 Encounters:   22 91.6 kg (201 lb 14.4 oz)        Anesthesia Evaluation            ROS/MED HX  ENT/Pulmonary:  - neg pulmonary ROS     Neurologic:       Cardiovascular: Comment: 2021 NSTEMI 2/2 coronary vasospasm  Recent echo WNL  METS >4, exercises three times per week    Echo 10/22  Interpretation Summary     Left ventricular systolic function is normal.  The visual ejection fraction is 55-60%.  No regional wall motion abnormalities noted.  Compared to , inferolateral hypokinesis is no longer seen. The study was  technically adequate.        METS/Exercise Tolerance:     Hematologic:       Musculoskeletal:       GI/Hepatic:       Renal/Genitourinary:       Endo:     (+) Obesity,     Psychiatric/Substance Use:       Infectious Disease:       Malignancy:       Other:            Physical Exam    Airway        Mallampati: II   TM distance: > 3 FB   Neck ROM: full     Respiratory Devices and Support         Dental           Cardiovascular   cardiovascular exam normal           Pulmonary   pulmonary exam normal                OUTSIDE LABS:  CBC:   Lab Results   Component Value Date    WBC 9.7 11/21/2021    WBC 9.1 11/20/2021    HGB 12.9 11/21/2021    HGB 12.8 11/20/2021    HCT 40.5 11/21/2021    HCT 40.0 11/20/2021     11/21/2021     11/20/2021     BMP:   Lab Results   Component Value Date     01/20/2022     11/21/2021    POTASSIUM 3.9 01/20/2022    POTASSIUM 4.4 11/21/2021    CHLORIDE 106 01/20/2022    CHLORIDE 105 11/21/2021    CO2 26 01/20/2022    CO2 24 11/21/2021    BUN 14 01/20/2022    BUN 13 11/21/2021    CR 0.82 01/20/2022    CR 0.75 11/21/2021    GLC 88 01/20/2022     (H) 11/21/2021     COAGS: No results found for: PTT, INR, FIBR  POC: No results found for: BGM, HCG, HCGS  HEPATIC:   Lab Results   Component Value Date    ALBUMIN 4.2 01/20/2022    PROTTOTAL 7.0 01/20/2022    ALT 33 01/20/2022    AST 26 01/20/2022    ALKPHOS 90 01/20/2022    BILITOTAL 0.7 01/20/2022     OTHER:   Lab Results   Component Value Date    A1C 5.1 11/21/2021    BERNADETTE 9.2 01/20/2022    TSH 2.02 10/26/2014       Anesthesia Plan    ASA Status:  3   NPO Status:  NPO Appropriate    Anesthesia Type: Spinal.      Maintenance: TIVA.        Consents    Anesthesia Plan(s) and associated risks, benefits, and realistic alternatives discussed. Questions answered and patient/representative(s) expressed understanding.     - Discussed: Risks, Benefits and Alternatives for BOTH SEDATION and the PROCEDURE were discussed     - Discussed with:  Patient         Postoperative Care    Pain management: IV analgesics, Oral pain medications, Peripheral nerve block (Single Shot), Multi-modal analgesia.   PONV prophylaxis: Ondansetron (or other 5HT-3), Dexamethasone or Solumedrol, Background Propofol Infusion     Comments:                Leda Leonard MD

## 2022-11-14 NOTE — ANESTHESIA POSTPROCEDURE EVALUATION
Patient: Terese Gonzalez    Procedure: Procedure(s):  Right total knee arthroplasty(Spinal with adductor canal block)       Anesthesia Type:  Spinal    Note:  Disposition: Inpatient   Postop Pain Control: Uneventful            Sign Out: Well controlled pain   PONV: No   Neuro/Psych: Uneventful            Sign Out: Acceptable/Baseline neuro status   Airway/Respiratory: Uneventful            Sign Out: Acceptable/Baseline resp. status   CV/Hemodynamics: Uneventful            Sign Out: Acceptable CV status; No obvious hypovolemia; No obvious fluid overload   Other NRE: NONE   DID A NON-ROUTINE EVENT OCCUR? No           Last vitals:  Vitals Value Taken Time   /69 11/14/22 1115   Temp     Pulse 62 11/14/22 1127   Resp 25 11/14/22 1127   SpO2 97 % 11/14/22 1127   Vitals shown include unvalidated device data.    Electronically Signed By: Leda Leonard MD  November 14, 2022  11:28 AM

## 2022-11-14 NOTE — ANESTHESIA PROCEDURE NOTES
"Adductor canal Procedure Note    Pre-Procedure   Staff -        Anesthesiologist:  Leda Leonard MD       Performed By: anesthesiologist       Location: pre-op       Procedure Start/Stop Times: 11/14/2022 8:15 AM and 11/14/2022 8:17 AM       Pre-Anesthestic Checklist: patient identified, IV checked, site marked, risks and benefits discussed, informed consent, monitors and equipment checked, pre-op evaluation, at physician/surgeon's request and post-op pain management  Timeout:       Correct Patient: Yes        Correct Procedure: Yes        Correct Site: Yes        Correct Position: Yes        Correct Laterality: Yes        Site Marked: Yes  Procedure Documentation  Procedure: Adductor canal       Laterality: left       Patient Position: supine       Skin prep: Betadine       Needle Type: short bevel       Needle Gauge: 21.        Needle Length (Inches): 4        Ultrasound guided       1. Ultrasound was used to identify targeted nerve, plexus, vascular marker, or fascial plane and place a needle adjacent to it in real-time.       2. Ultrasound was used to visualize the spread of anesthetic in close proximity to the above referenced structure.       4. The visualized anatomic structures appeared normal.       5. There were no apparent abnormal pathologic findings.    Assessment/Narrative         The placement was negative for: blood aspirated and painful injection       Insertion/Infusion Method: Single Shot       Complications: none       Injection made incrementally with aspirations every 5 mL.    Medication(s) Administered   Bupivacaine 0.25% w/ 1:200K Epi (Injection) - Injection   20 mL - 11/14/2022 8:15:00 AM  Medication Administration Time: 11/14/2022 8:15 AM      FOR [facility] ([facility]) ONLY:   Pain Team Contact information: please page the Pain Team Via [employer]. Search \"Pain\". During daytime hours, please page the attending first. At night please page the resident first.    "

## 2022-11-14 NOTE — BRIEF OP NOTE
TKR for Pre/Post OA knee  Francisco  TT est 35 w  (see dictation for full)  Spec none  No anticipated complications

## 2022-11-14 NOTE — ANESTHESIA CARE TRANSFER NOTE
Patient: Terese Gonzalez    Procedure: Procedure(s):  Right total knee arthroplasty(Spinal with adductor canal block)       Diagnosis: Osteoarthritis [M19.90]  Diagnosis Additional Information: No value filed.    Anesthesia Type:   Spinal     Note:    Oropharynx: oropharynx clear of all foreign objects and spontaneously breathing  Level of Consciousness: awake  Oxygen Supplementation: room air    Independent Airway: airway patency satisfactory and stable    Vital Signs Stable: post-procedure vital signs reviewed and stable  Report to RN Given: handoff report given  Patient transferred to: PACU    Handoff Report: Identifed the Patient, Identified the Reponsible Provider, Reviewed the pertinent medical history, Discussed the surgical course, Reviewed Intra-OP anesthesia mangement and issues during anesthesia, Set expectations for post-procedure period and Allowed opportunity for questions and acknowledgement of understanding      Vitals:  Vitals Value Taken Time   /57 11/14/22 0951   Temp     Pulse 79 11/14/22 0953   Resp 13 11/14/22 0953   SpO2 98 % 11/14/22 0953   Vitals shown include unvalidated device data.    Electronically Signed By: ANAMIKA Palma CRNA  November 14, 2022  9:55 AM

## 2022-11-14 NOTE — ANESTHESIA PROCEDURE NOTES
"Intrathecal injection Procedure Note    Pre-Procedure   Staff -        Anesthesiologist:  Leda Leonard MD       Performed By: anesthesiologist       Location: OR       Procedure Start/Stop Times: 11/14/2022 8:25 AM and 11/14/2022 8:29 AM       Pre-Anesthestic Checklist: patient identified, IV checked, risks and benefits discussed, informed consent, monitors and equipment checked, pre-op evaluation and at physician/surgeon's request  Timeout:       Correct Patient: Yes        Correct Procedure: Yes        Correct Site: Yes        Correct Position: Yes   Procedure Documentation  Procedure: intrathecal injection       Patient Position: sitting       Skin prep: Chloraprep       Insertion Site: L3-4. (midline approach).       Needle Gauge: 22.        Needle Length (Inches): 3.5        Spinal Needle Type: Jaren tipNo introducer used       # of attempts: 2 and  # of redirects:  1    Assessment/Narrative         Paresthesias: No.       Sensory Level: T8       CSF fluid: clear.    Medication(s) Administered   0.5% Bupivacaine PF (Intrathecal) - Intrathecal   2 mL - 11/14/2022 8:25:00 AM  Medication Administration Time: 11/14/2022 8:25 AM     Comments:  First attempt with 24 G pencan. Second attempt 22G jaren at same level      FOR [facility] ONLY:   Pain Team Contact information: please page the Pain Team Via [employer]. Search \"Pain\". During daytime hours, please page the attending first. At night please page the resident first.    "

## 2022-11-15 ENCOUNTER — APPOINTMENT (OUTPATIENT)
Dept: PHYSICAL THERAPY | Facility: CLINIC | Age: 62
End: 2022-11-15
Attending: ORTHOPAEDIC SURGERY
Payer: COMMERCIAL

## 2022-11-15 VITALS
DIASTOLIC BLOOD PRESSURE: 67 MMHG | WEIGHT: 201.9 LBS | RESPIRATION RATE: 16 BRPM | TEMPERATURE: 98.3 F | SYSTOLIC BLOOD PRESSURE: 117 MMHG | HEART RATE: 89 BPM | BODY MASS INDEX: 31.99 KG/M2 | OXYGEN SATURATION: 99 %

## 2022-11-15 LAB
GLUCOSE SERPL-MCNC: 96 MG/DL (ref 70–99)
HGB BLD-MCNC: 11.4 G/DL (ref 11.7–15.7)
HOLD SPECIMEN: NORMAL

## 2022-11-15 PROCEDURE — 99204 OFFICE O/P NEW MOD 45 MIN: CPT | Performed by: PHYSICIAN ASSISTANT

## 2022-11-15 PROCEDURE — 250N000013 HC RX MED GY IP 250 OP 250 PS 637: Performed by: PHYSICIAN ASSISTANT

## 2022-11-15 PROCEDURE — 36415 COLL VENOUS BLD VENIPUNCTURE: CPT | Performed by: PHYSICIAN ASSISTANT

## 2022-11-15 PROCEDURE — 82947 ASSAY GLUCOSE BLOOD QUANT: CPT | Performed by: ORTHOPAEDIC SURGERY

## 2022-11-15 PROCEDURE — 97116 GAIT TRAINING THERAPY: CPT | Mod: GP | Performed by: PHYSICAL THERAPIST

## 2022-11-15 PROCEDURE — 97110 THERAPEUTIC EXERCISES: CPT | Mod: GP | Performed by: PHYSICAL THERAPIST

## 2022-11-15 PROCEDURE — 85018 HEMOGLOBIN: CPT | Performed by: PHYSICIAN ASSISTANT

## 2022-11-15 PROCEDURE — 97530 THERAPEUTIC ACTIVITIES: CPT | Mod: GP | Performed by: PHYSICAL THERAPIST

## 2022-11-15 PROCEDURE — 999N000111 HC STATISTIC OT IP EVAL DEFER

## 2022-11-15 PROCEDURE — 250N000013 HC RX MED GY IP 250 OP 250 PS 637: Performed by: ORTHOPAEDIC SURGERY

## 2022-11-15 PROCEDURE — 258N000003 HC RX IP 258 OP 636: Performed by: PHYSICIAN ASSISTANT

## 2022-11-15 RX ORDER — DULOXETIN HYDROCHLORIDE 20 MG/1
20 CAPSULE, DELAYED RELEASE ORAL DAILY
Status: DISCONTINUED | OUTPATIENT
Start: 2022-11-15 | End: 2022-11-15 | Stop reason: HOSPADM

## 2022-11-15 RX ADMIN — HYDROXYZINE HYDROCHLORIDE 25 MG: 25 TABLET, FILM COATED ORAL at 11:01

## 2022-11-15 RX ADMIN — HYDROXYZINE HYDROCHLORIDE 25 MG: 25 TABLET, FILM COATED ORAL at 05:07

## 2022-11-15 RX ADMIN — SENNOSIDES AND DOCUSATE SODIUM 1 TABLET: 50; 8.6 TABLET ORAL at 07:36

## 2022-11-15 RX ADMIN — DULOXETINE HYDROCHLORIDE 20 MG: 20 CAPSULE, DELAYED RELEASE ORAL at 10:07

## 2022-11-15 RX ADMIN — OXYCODONE HYDROCHLORIDE 10 MG: 5 TABLET ORAL at 11:47

## 2022-11-15 RX ADMIN — ACETAMINOPHEN 650 MG: 325 TABLET, FILM COATED ORAL at 11:01

## 2022-11-15 RX ADMIN — ACETAMINOPHEN 650 MG: 325 TABLET, FILM COATED ORAL at 05:07

## 2022-11-15 RX ADMIN — OXYCODONE HYDROCHLORIDE 5 MG: 5 TABLET ORAL at 07:36

## 2022-11-15 RX ADMIN — ASPIRIN 325 MG: 325 TABLET, COATED ORAL at 07:37

## 2022-11-15 RX ADMIN — ACETAMINOPHEN 650 MG: 325 TABLET, FILM COATED ORAL at 00:40

## 2022-11-15 RX ADMIN — SODIUM CHLORIDE 1000 ML: 9 INJECTION, SOLUTION INTRAVENOUS at 10:07

## 2022-11-15 RX ADMIN — OXYCODONE HYDROCHLORIDE 5 MG: 5 TABLET ORAL at 02:36

## 2022-11-15 ASSESSMENT — ACTIVITIES OF DAILY LIVING (ADL)
ADLS_ACUITY_SCORE: 20

## 2022-11-15 NOTE — CONSULTS
Care Management Discharge Note    Discharge Date: 11/15/2022       Discharge Disposition:  Home w spouse    Discharge Services:  OPPT    Discharge DME: none     Discharge Transportation:  wife      Additional Information:  Care Transitions Team: Following for CC, discharge planning, and disposition post TKA.      Per chart review MD has consulted CM/SW for potential for disposition concerns.     Per PT assessment, patient should be ready to discharge home. Per Ortho, anticipate discharge to home with family later this am. Will need ace wrap off and orin stockings on prior to discharge. Has outpatient PT set up already. No anticipated CM needs at this time.       Please re- consult CM if there are changes/concern with above plan.                Susana Cage RN BSN CM  Inpatient Care Coordination  [facility]  [phone]

## 2022-11-15 NOTE — PLAN OF CARE
Goal Outcome Evaluation:       Reviewed discharge instructions and medications with patient and spouse. Questions answered. Patient discharged to home with spouse, discharge instructions, medications (Oxycodone, miralax, acetaminoohen, atarax, aspirin, ibuprophen, senexon), and belongings.

## 2022-11-15 NOTE — CONSULTS
Hospitalist Consultation      Terese Gonzalez MRN# [id number]   YOB: 1960 Age: 62 year old   Date of Admission: 11/14/2022     Requesting Physician:  Nubia Sandy  Reason for consult: Medical management           Assessment and Plan:   This patient is a 62 year old female with a PMH significant for coronary vasospasm with history of NSTEMI who is POD 1 s/p right total knee arthroplasty.    # S/p right total knee arthroplasty  Overall doing well with pain adequately controlled.  Is eating and drinking without difficulty and passing gas.  Urinating independently.  Did have episode of lightheadedness/dizziness while working with PT.  -cont PT/OT  -pain control as per primary  -Aspirin for DVT prophylaxis  -Lightheadedness/dizziness discussion below    #Hypotension with lightheadedness/dizziness  -During PT session patient had episode of lightheadedness/dizziness with hypotension with BP 94/49  -Preoperatively hemoglobin was 13.7 and postoperatively 11.4  -Eating and drinking without difficulty  -Given a liter of IV fluids with orthostatics rechecked and those are negative for now.  She has not had recurrent symptoms  -Plan to work again with PT and if asymptomatic may discharge home  -Suspect symptoms are multifactorial related to volume loss, pain medicines, pain and she also reports previous history of lightheadedness/dizziness when standing    #History of NSTEMI due to vasospasm  -Coronary arteries clear    #Hypertension  -She takes losartan 12.5 mg daily  -Commend she not take this when she goes home and less on blood pressure check her blood pressure is consistently greater than 130 systolically          DVT Prophylaxis: on Aspirin as per primary  D/C planning: To home once medically clear             History of Present Illness:   This patient is a 62 year old female who is POD 1 s/p right total knee arthoplasty.   Intra-op report reviewed and showed no intra-op complications.   I/o's  reviewed, Currently net positive with good UOP since OR. Hgb stable this am at 11.4.      She has felt well post procedurally but while working with PT today she had episode of lightheadedness/dizziness with hypotension.  This is not new to her and has happened in the past.  She has been eating and drinking without difficulty, urinating and passing gas.  She feels better after receiving a liter of IV fluids.                Past Medical History:     Past Medical History:   Diagnosis Date     Arthritis     knee     Heart attack (H)      PONV (postoperative nausea and vomiting)                Past Surgical History:     Past Surgical History:   Procedure Laterality Date     ARTHROPLASTY KNEE Right 11/14/2022    Procedure: Right total knee arthroplasty;  Surgeon: Yinka Singh MD;  Location: [facility] OR     CV CORONARY ANGIOGRAM N/A 11/21/2021    Procedure: Coronary Angiogram;  Surgeon: Raymundo Cope MD;  Location:  [facility] CARDI[facility] CATH LAB     CV LEFT HEART CATH N/A 11/21/2021    Procedure: Left Heart Cath;  Surgeon: Raymundo Cope MD;  Location:  HE[facility] CARDIAC CATH LAB                 Social History:     Social History     Tobacco Use     Smoking status: Never     Smokeless tobacco: Never   Substance Use Topics     Alcohol use: Yes     Comment: rarely     Drug use: Never                 Family History:   Family Hx fully reviewed and is non contributory to this admission.             Allergies:     Allergies   Allergen Reactions     Adhesive Tape Rash             Medications:     Prior to Admission medications    Medication Sig Last Dose Taking? Auth Provider Long Term End Date   acetaminophen (TYLENOL) 325 MG tablet Take 2 tablets (650 mg) by mouth every 4 hours as needed for other (mild pain) May use regular strength (325mg) acetaminophen over the counter medication when Rx runs out.  Yes Nubia Sandy PA-C No    aspirin (ASA) 325 MG EC tablet Take 1 tablet (325 mg) by  mouth daily  Yes Nubia Sandy PA-C No    aspirin 81 MG EC tablet Take 81 mg by mouth daily Past Month Yes Reported, Patient No    atorvastatin (LIPITOR) 40 MG tablet Take 1 tablet (40 mg) by mouth daily 11/13/2022 at 2100 Yes Karen Telles CNP Yes    cetirizine (ZYRTEC) 10 MG tablet Take 10 mg by mouth daily 11/14/2022 at 0430 Yes Unknown, Entered By History     cyanocobalamin (VITAMIN B-12) 1000 MCG tablet Take 1,000 mcg by mouth daily 11/14/2022 at 0430 Yes Unknown, Entered By History     DULoxetine (CYMBALTA) 20 MG capsule Take 20 mg by mouth daily 11/14/2022 at 0430 Yes Reported, Patient Yes    hydrOXYzine (ATARAX) 25 MG tablet Take 1 tablet (25 mg) by mouth every 6 hours as needed for itching or anxiety (with pain, moderate pain)  Yes Nubia Sandy PA-C No    ibuprofen (ADVIL/MOTRIN) 600 MG tablet Take 1 tablet (600 mg) by mouth every 6 hours as needed (mild) May use over the counter ibuprofen when Rx runs out.  Yes Nubia Sandy PA-C No    losartan (COZAAR) 25 MG tablet Take 12.5 mg by mouth daily 11/13/2022 at 0800 Yes Reported, Patient Yes    nitroGLYcerin (NITROSTAT) 0.4 MG sublingual tablet Place 0.4 mg under the tongue as needed Unknown Yes Reported, Patient Yes    oxyCODONE (ROXICODONE) 5 MG tablet Take 1-2 tablets (5-10 mg) by mouth every 4 hours as needed for moderate to severe pain or severe pain Begin weaning on POD3  Yes Nubia Sandy PA-C     polyethylene glycol (MIRALAX) 17 g packet Take 17 g by mouth daily  Yes Nubia Sandy PA-C     senna-docusate (SENOKOT-S/PERICOLACE) 8.6-50 MG tablet Take 1-2 tablets by mouth 2 times daily Take while on oral narcotics to prevent or treat constipation.  Yes Nubia Sandy PA-C                 Review of Systems:     A comprehensive greater than 10 system review of systems was carried out.  Pertinent positives and negatives are noted above.  Otherwise negative for contributory info.            Physical  Exam:   Vitals were reviewed  Blood pressure 117/67, pulse 89, temperature 98.3  F (36.8  C), temperature source Temporal, resp. rate 16, weight 91.6 kg (201 lb 14.4 oz), SpO2 99 %.  Exam:    GENERAL:  Comfortable.  PSYCH: pleasant, oriented, No acute distress.  HEENT:  PERRLA. Normal conjunctiva, normal hearing, nasal mucosa and Oropharynx are normal.  NECK:  Supple, no neck vein distention, adenopathy or bruits, normal thyroid.  HEART:  Normal S1, S2 with no murmur, no pericardial rub, S3 or S4.  LUNGS:  Clear to auscultation, normal Respiratory effort.  ABDOMEN:  Soft, no hepatosplenomegaly, normal bowel sounds.  EXTREMITIES:  No pedal edema, +2 pulses bilateral and equal.  SKIN:  Dry to touch, No rash, wound or ulcerations.  NEUROLOGIC:  Nonfocal with normal cranial nerve and motor power and sensation.            Data:   Past 24 hours labs, studies, and imaging were reviewed.  Recent Labs   Lab 11/15/22  0615   HGB 11.4*     Recent Labs   Lab 11/15/22  0615   GLC 96       Jennifer Pérez PA-C

## 2022-11-15 NOTE — PROGRESS NOTES
"Orthopedic Surgery  11/15/2022  POD 1    S: Patient voices no unexpected ortho complaints today. Denies chest pain or shortness of breath. Did well overnight. \"I have been doing exercises in bed all night.\"    O: Blood pressure 104/64, pulse 64, temperature 97.8  F (36.6  C), temperature source Temporal, resp. rate 15, weight 91.6 kg (201 lb 14.4 oz), SpO2 94 %.  Lab Results   Component Value Date    HGB 12.9 11/21/2021    HGB 13.8 10/26/2014     No results found for: INR  I/O last 3 completed shifts:  In: 1430 [P.O.:480; I.V.:950]  Out: 850 [Urine:800; Blood:50]  Distal extremity CMSI bilaterally.  Calves are negative bilaterally, both soft and nontender.  The dressing is C/D/I.      A: Ms. Gonzalez is doing well status post Procedure(s):  Right total knee arthroplasty(Spinal with adductor canal block).    P: Continue physical therapy. Continue DVT pphx with ASA due to high mobility and low risk factors for DVT. Anticipate discharge to home with family later this am. Will need ace wrap off and orin stockings on prior to discharge. Has outpatient PT set up already.    Nubia Sandy PA-C  [phone]  "

## 2022-11-15 NOTE — PLAN OF CARE
A&Ox4. IV SL. Lungs clear. Bowel sounds active; passing gas. Ace wrap CDI. CMS intact.     Patient vital signs are at baseline: Yes  Patient able to ambulate as they were prior to admission or with assist devices provided by therapies during their stay:  Yes; SBA using the walker and gait belt.   Patient MUST void prior to discharge:  Yes, voiding in the bathroom.   Patient able to tolerate oral intake:  Yes, tolerating a regular diet.   Pain has adequate pain control using Oral analgesics:  Yes, taking Oxycodone, Atarax, and Tylenol.   Does patient have an identified :  Yes  Has goal D/C date and time been discussed with patient:  Yes     Goal Outcome Evaluation:      Plan of Care Reviewed With: patient          Outcome Evaluation: Pt anticipating discharing home tomorrow after therapy.

## 2022-11-15 NOTE — PROGRESS NOTES
Patient vital signs are at baseline: No,  Reason:  Hypotensive during PT. Will be given bolus and check orthostatic BP's.   Patient able to ambulate as they were prior to admission or with assist devices provided by therapies during their stay:  Yes  Patient MUST void prior to discharge:  Yes  Patient able to tolerate oral intake:  Yes  Pain has adequate pain control using Oral analgesics:  Yes  Does patient have an identified :  Yes  Has goal D/C date and time been discussed with patient:  Yes

## 2022-11-15 NOTE — PLAN OF CARE
Physical Therapy Discharge Summary    Reason for therapy discharge:    Discharged to home with outpatient therapy.    Progress towards therapy goal(s). See goals on Care Plan in [facility] electronic health record for goal details.  Goals partially met.  Barriers to achieving goals:   discharge from facility.    Therapy recommendation(s):    Defer to ortho

## 2022-11-15 NOTE — PROGRESS NOTES
Occupational Therapy: Orders received. Chart reviewed and discussed with care team.? Occupational Therapy not indicated due to pt with no ADL concerns or needs.  present to assist..? Defer discharge recommendations to care team.? Will complete orders.

## 2022-11-15 NOTE — PLAN OF CARE
Patient vital signs are at baseline: Yes  Patient able to ambulate as they were prior to admission or with assist devices provided by therapies during their stay:  Yes SBA with walker and gait belt  Patient MUST void prior to discharge:  Yes  Patient able to tolerate oral intake:  Yes  Pain has adequate pain control using Oral analgesics:  Yes  Does patient have an identified :  Yes  Has goal D/C date and time been discussed with patient:  Yes    Patient resting in bed. Explained POC and safety. PRN oxycodone, tylenol, and atarax given for pain. Ice pack applied over right knee. Ambulated in room with SBA using walker and gait belt. Slept well. Instructed patient to call with needs. Patient verbalized understanding. Bed alarm on, will continue to monitor and provide care.

## 2022-11-15 NOTE — PROGRESS NOTES
Hospitalist consult received:    Patient doing well and will likely discharge today.    Meds reviewed and restarted Losartan and Duloxetine    Please page if patient not discharging today or you would like us to see prior to discharge    Olga Pérez  [phone]

## 2022-11-16 ENCOUNTER — MYC MEDICAL ADVICE (OUTPATIENT)
Dept: CARDIOLOGY | Facility: CLINIC | Age: 62
End: 2022-11-16

## 2022-11-26 ENCOUNTER — HEALTH MAINTENANCE LETTER (OUTPATIENT)
Age: 62
End: 2022-11-26

## 2023-01-05 ENCOUNTER — OFFICE VISIT (OUTPATIENT)
Dept: CARDIOLOGY | Facility: CLINIC | Age: 63
End: 2023-01-05
Attending: INTERNAL MEDICINE
Payer: COMMERCIAL

## 2023-01-05 VITALS
HEART RATE: 66 BPM | OXYGEN SATURATION: 99 % | WEIGHT: 204.3 LBS | DIASTOLIC BLOOD PRESSURE: 85 MMHG | SYSTOLIC BLOOD PRESSURE: 121 MMHG | HEIGHT: 68 IN | BODY MASS INDEX: 30.96 KG/M2

## 2023-01-05 DIAGNOSIS — I21.4 NSTEMI (NON-ST ELEVATED MYOCARDIAL INFARCTION) (H): ICD-10-CM

## 2023-01-05 PROCEDURE — G0463 HOSPITAL OUTPT CLINIC VISIT: HCPCS

## 2023-01-05 PROCEDURE — 99212 OFFICE O/P EST SF 10 MIN: CPT | Performed by: INTERNAL MEDICINE

## 2023-01-05 PROCEDURE — 99213 OFFICE O/P EST LOW 20 MIN: CPT | Performed by: INTERNAL MEDICINE

## 2023-01-05 RX ORDER — ATORVASTATIN CALCIUM 40 MG/1
40 TABLET, FILM COATED ORAL DAILY
Qty: 90 TABLET | Refills: 3 | Status: SHIPPED | OUTPATIENT
Start: 2023-01-05 | End: 2024-02-13

## 2023-01-05 RX ORDER — LOSARTAN POTASSIUM 25 MG/1
12.5 TABLET ORAL DAILY
Qty: 45 TABLET | Refills: 3 | Status: SHIPPED | OUTPATIENT
Start: 2023-01-05

## 2023-01-05 ASSESSMENT — PAIN SCALES - GENERAL: PAINLEVEL: NO PAIN (0)

## 2023-01-05 NOTE — PROGRESS NOTES
CARDIOLOGY CLINIC FOLLOW UP    HPI: Terese Gonzalez is a 62 year old female, being seen today for recheck of MINOCA.     She is here for follow up of MINOCA thought to be due to coronary vasospasm. We have previously recommended she stay on Imdur, she is now off that and is now on an ARB based on [facility] recommendations. She remains on aspirin and statin. Her echo this year showed recovery of her EF and WMA.    She recently had R TKA in November last year.    She does not report any additional chest pain, shortness of breath, orthopnea, PND, palpitations, lightheadedness or syncope since increasing the Imdur dosage.    PAST MEDICAL HISTORY:  Past Medical History:   Diagnosis Date     Arthritis     knee     Heart attack (H)      PONV (postoperative nausea and vomiting)        CURRENT MEDICATIONS:  Current Outpatient Medications   Medication Sig Dispense Refill     acetaminophen (TYLENOL) 325 MG tablet Take 2 tablets (650 mg) by mouth every 4 hours as needed for other (mild pain) May use regular strength (325mg) acetaminophen over the counter medication when Rx runs out. 100 tablet 0     aspirin (ASA) 325 MG EC tablet Take 1 tablet (325 mg) by mouth daily 30 tablet 0     aspirin 81 MG EC tablet Take 81 mg by mouth daily       atorvastatin (LIPITOR) 40 MG tablet Take 1 tablet (40 mg) by mouth daily 90 tablet 3     cetirizine (ZYRTEC) 10 MG tablet Take 10 mg by mouth daily       cyanocobalamin (VITAMIN B-12) 1000 MCG tablet Take 1,000 mcg by mouth daily       DULoxetine (CYMBALTA) 20 MG capsule Take 20 mg by mouth daily       hydrOXYzine (ATARAX) 25 MG tablet Take 1 tablet (25 mg) by mouth every 6 hours as needed for itching or anxiety (with pain, moderate pain) 30 tablet 0     ibuprofen (ADVIL/MOTRIN) 600 MG tablet Take 1 tablet (600 mg) by mouth every 6 hours as needed (mild) May use over the counter ibuprofen when Rx runs out. 30 tablet 0     losartan (COZAAR) 25 MG tablet Take 12.5 mg by mouth daily        nitroGLYcerin (NITROSTAT) 0.4 MG sublingual tablet Place 0.4 mg under the tongue as needed       oxyCODONE (ROXICODONE) 5 MG tablet Take 1-2 tablets (5-10 mg) by mouth every 4 hours as needed for moderate to severe pain or severe pain Begin weaning on POD3 30 tablet 0     polyethylene glycol (MIRALAX) 17 g packet Take 17 g by mouth daily 10 packet 0     senna-docusate (SENOKOT-S/PERICOLACE) 8.6-50 MG tablet Take 1-2 tablets by mouth 2 times daily Take while on oral narcotics to prevent or treat constipation. 30 tablet 0       PAST SURGICAL HISTORY:  Past Surgical History:   Procedure Laterality Date     ARTHROPLASTY KNEE Right 11/14/2022    Procedure: Right total knee arthroplasty;  Surgeon: Yinka Singh MD;  Location: [facility] OR     CV CORONARY ANGIOGRAM N/A 11/21/2021    Procedure: Coronary Angiogram;  Surgeon: Raymundo Cope MD;  [facility]     CV LEFT HEART CATH N/A 11/21/2021    Procedure: Left Heart Cath;  Surgeon: Raymundo Cope MD;  [facility]       ALLERGIES  Adhesive tape    FAMILY HX:  No family history on file.    SOCIAL HX:  Social History     Socioeconomic History     Marital status:      Spouse name: None     Number of children: None     Years of education: None     Highest education level: None   Occupational History     None   Tobacco Use     Smoking status: Never Smoker     Smokeless tobacco: Never Used   Substance and Sexual Activity     Alcohol use: None     Drug use: None     Sexual activity: None   Other Topics Concern     None   Social History Narrative     None     Social Determinants of Health     Financial Resource Strain: Not on file   Food Insecurity: Not on file   Transportation Needs: Not on file   Physical Activity: Not on file   Stress: Not on file   Social Connections: Not on file   Intimate Partner Violence: Not on file   Housing Stability: Not on file       ROS:  Constitutional: No fever,  chills, or sweats. No weight gain/loss.   ENT: No visual disturbance, ear ache, epistaxis, sore throat.   Allergies/Immunologic: Negative.   Respiratory: No cough, hemoptysis.   Cardiovascular: As per HPI.   GI: No nausea, vomiting, hematemesis, melena, or hematochezia.   : No urinary frequency, dysuria, or hematuria.   Integument: Negative.   Psychiatric: Negative.   Neuro: Negative.   Endocrinology: Negative.   Musculoskeletal: No myalgia.    VITAL SIGNS:  There were no vitals taken for this visit.  There is no height or weight on file to calculate BMI.  Wt Readings from Last 2 Encounters:   11/14/22 91.6 kg (201 lb 14.4 oz)   01/20/22 89.4 kg (197 lb 1.6 oz)       PHYSICAL EXAM  Terese Gonzalez is a 62 year old female in no acute distress.  HEENT: Unremarkable.  Neck: JVP normal.  Carotids +4/4 bilaterally without bruits.  Lungs: CTA.  Cor: RRR. Normal S1 and S2.  No murmur, rub, or gallop.  PMI in Lf 5th ICS.  Abd: Soft, nontender, nondistended.  NABS.  No pulsatile mass.  Extremities: No C/C/E.  Pulses +4/4 symmetric in upper and lower extremities.  Neuro: Grossly intact.    LABS    Lab Results   Component Value Date    WBC 9.7 11/21/2021    WBC 8.1 10/26/2014     Lab Results   Component Value Date    RBC 4.47 11/21/2021    RBC 4.66 10/26/2014     Lab Results   Component Value Date    HGB 12.9 11/21/2021    HGB 13.8 10/26/2014     Lab Results   Component Value Date    HCT 40.5 11/21/2021    HCT 41.8 10/26/2014     No components found for: MCT  Lab Results   Component Value Date    MCV 91 11/21/2021    MCV 90 10/26/2014     Lab Results   Component Value Date    MCH 28.9 11/21/2021    MCH 29.6 10/26/2014     Lab Results   Component Value Date    MCHC 31.9 11/21/2021    MCHC 33.0 10/26/2014     Lab Results   Component Value Date    RDW 13.3 11/21/2021    RDW 13.8 10/26/2014     Lab Results   Component Value Date     11/21/2021     10/26/2014      Recent Labs   Lab Test 01/20/22  0830  21  0508    136   POTASSIUM 3.9 4.4   CHLORIDE 106 105   CO2 26 24   ANIONGAP 8 7   GLC 88 106*   BUN 14 13   CR 0.82 0.75   BERNADETTE 9.2 9.2     Recent Labs   Lab Test 22  0830 21  0617   CHOL 124 216*   HDL 63 49*   LDL 44 138*   TRIG 84 147   NHDL 61 167*        EK21  Sinus  Inferior MI of indeterminate age    ECHO: 21  The visual ejection fraction is estimated at 55%.  distal posterolateral hypokinesis  The right ventricle is normal in structure, function and size.  There is trace to mild tricuspid regurgitation.  The patient exhibited occasional PVCs.  There is no comparison study available.  ______________________________________________________________________________      MRI TEST:  21  1. The LV is normal in cavity size with wall thinning in the apical lateral segment. The global systolic  function is mildly reduced. The LVEF is 49%. There is akinesis of the apical lateral and mid inferolateral  segments.      2. The RV is normal in cavity size. The global systolic function is normal. The RVEF is 64%.      3. Both atria are normal in size.     4. There is no significant valvular disease.      5. Late gadolinium enhancement imaging shows hyperenhancement in the mid infeorlateral and apical lateral  segments. These findings are consistent with a prior myocardial infarction in the left circumflex  territory.      6. There is no pericardial effusion or thickening.     7. There is no intracardiac thrombus.     CONCLUSIONS: Ischemic cardiomyopathy with mildly reduced LV function, LVEF of 49% and evidence of prior  myocardial infarction in the LCx territory, MINOCA. Normal RV function, RVEF of 64%.     CARDIAC CATH:  21  Conclusion         Left ventricular filling pressures are normal.    Hemodynamic data has been modified in Epic per physician review.     MINOCA              Plan      Follow bedrest per protocol    Continued medical management and lifestyle  modifications for cardiovascular risk factor optimizations.    Arterial sheath removed from radial artery with TR band placement.    Cardiac rehabilitation.    Return to the primary inpatient team for further evaluation and managmenet      Start Imdur and wean nitroglycerin drip.  Cardiac MRI as outpatient.  Discharge tomorrow if pain free on PO nitrates. PRN SL nitroglycerin.       ASSESSMENT AND PLAN:    1. MINOCA; likely coronary vasospasm  -- aspirin, statin lifelong  -- ok to stop ARB if desired as evidence for using it is weak    RTC annually    Raymundo Cope MD

## 2023-01-05 NOTE — NURSING NOTE
Chief Complaint   Patient presents with     Follow Up     reason for visit: NSTEMI     Vitals were taken and medications reconciled.    Giuseppe Looney, EMT  9:42 AM

## 2023-01-05 NOTE — LETTER
1/5/2023      RE: Terese Gonzalez  [address]  [facility] [address]       Dear Colleague,    Thank you for the opportunity to participate in the care of your patient, Terese Gonzalez, at the [facility] HEART [facility]IN[facility] [place] [facility]. Please see a copy of my visit note below.    CARDIOLOGY CLINIC FOLLOW UP    HPI: Terese Gonzalez is a 62 year old female, being seen today for recheck of MINOCA.     She is here for follow up of MINOCA thought to be due to coronary vasospasm. We have previously recommended she stay on Imdur, she is now off that and is now on an ARB based on [facility] recommendations. She remains on aspirin and statin. Her echo this year showed recovery of her EF and WMA.    She recently had R TKA in November last year.    She does not report any additional chest pain, shortness of breath, orthopnea, PND, palpitations, lightheadedness or syncope since increasing the Imdur dosage.    PAST MEDICAL HISTORY:  Past Medical History:   Diagnosis Date     Arthritis     knee     Heart attack (H)      PONV (postoperative nausea and vomiting)        CURRENT MEDICATIONS:  Current Outpatient Medications   Medication Sig Dispense Refill     acetaminophen (TYLENOL) 325 MG tablet Take 2 tablets (650 mg) by mouth every 4 hours as needed for other (mild pain) May use regular strength (325mg) acetaminophen over the counter medication when Rx runs out. 100 tablet 0     aspirin (ASA) 325 MG EC tablet Take 1 tablet (325 mg) by mouth daily 30 tablet 0     aspirin 81 MG EC tablet Take 81 mg by mouth daily       atorvastatin (LIPITOR) 40 MG tablet Take 1 tablet (40 mg) by mouth daily 90 tablet 3     cetirizine (ZYRTEC) 10 MG tablet Take 10 mg by mouth daily       cyanocobalamin (VITAMIN B-12) 1000 MCG tablet Take 1,000 mcg by mouth daily       DULoxetine (CYMBALTA) 20 MG capsule Take 20 mg by mouth daily       hydrOXYzine (ATARAX) 25 MG tablet Take 1 tablet (25  mg) by mouth every 6 hours as needed for itching or anxiety (with pain, moderate pain) 30 tablet 0     ibuprofen (ADVIL/MOTRIN) 600 MG tablet Take 1 tablet (600 mg) by mouth every 6 hours as needed (mild) May use over the counter ibuprofen when Rx runs out. 30 tablet 0     losartan (COZAAR) 25 MG tablet Take 12.5 mg by mouth daily       nitroGLYcerin (NITROSTAT) 0.4 MG sublingual tablet Place 0.4 mg under the tongue as needed       oxyCODONE (ROXICODONE) 5 MG tablet Take 1-2 tablets (5-10 mg) by mouth every 4 hours as needed for moderate to severe pain or severe pain Begin weaning on POD3 30 tablet 0     polyethylene glycol (MIRALAX) 17 g packet Take 17 g by mouth daily 10 packet 0     senna-docusate (SENOKOT-S/PERICOLACE) 8.6-50 MG tablet Take 1-2 tablets by mouth 2 times daily Take while on oral narcotics to prevent or treat constipation. 30 tablet 0       PAST SURGICAL HISTORY:  Past Surgical History:   Procedure Laterality Date     ARTHROPLASTY KNEE Right 11/14/2022    Procedure: Right total knee arthroplasty;  Surgeon: Yinka Singh MD;  Location: [facility] OR     CV CORONARY ANGIOGRAM N/A 11/21/2021    Procedure: Coronary Angiogram;  Surgeon: Raymundo Cope MD;  Location:  HEART CARDIAC CATH LAB     CV LEFT HEART CATH N/A 11/21/2021    Procedure: Left Heart Cath;  Surgeon: Raymundo Cope MD;  Location:  HEART CARDIAC CATH LAB       ALLERGIES  Adhesive tape    FAMILY HX:  No family history on file.    SOCIAL HX:  Social History     Socioeconomic History     Marital status:      Spouse name: None     Number of children: None     Years of education: None     Highest education level: None   Occupational History     None   Tobacco Use     Smoking status: Never Smoker     Smokeless tobacco: Never Used   Substance and Sexual Activity     Alcohol use: None     Drug use: None     Sexual activity: None   Other Topics Concern     None   Social History Narrative     None      Social Determinants of Health     Financial Resource Strain: Not on file   Food Insecurity: Not on file   Transportation Needs: Not on file   Physical Activity: Not on file   Stress: Not on file   Social Connections: Not on file   Intimate Partner Violence: Not on file   Housing Stability: Not on file       ROS:  Constitutional: No fever, chills, or sweats. No weight gain/loss.   ENT: No visual disturbance, ear ache, epistaxis, sore throat.   Allergies/Immunologic: Negative.   Respiratory: No cough, hemoptysis.   Cardiovascular: As per HPI.   GI: No nausea, vomiting, hematemesis, melena, or hematochezia.   : No urinary frequency, dysuria, or hematuria.   Integument: Negative.   Psychiatric: Negative.   Neuro: Negative.   Endocrinology: Negative.   Musculoskeletal: No myalgia.    VITAL SIGNS:  There were no vitals taken for this visit.  There is no height or weight on file to calculate BMI.  Wt Readings from Last 2 Encounters:   11/14/22 91.6 kg (201 lb 14.4 oz)   01/20/22 89.4 kg (197 lb 1.6 oz)       PHYSICAL EXAM  Terese Gonzalez is a 62 year old female in no acute distress.  HEENT: Unremarkable.  Neck: JVP normal.  Carotids +4/4 bilaterally without bruits.  Lungs: CTA.  Cor: RRR. Normal S1 and S2.  No murmur, rub, or gallop.  PMI in Lf 5th ICS.  Abd: Soft, nontender, nondistended.  NABS.  No pulsatile mass.  Extremities: No C/C/E.  Pulses +4/4 symmetric in upper and lower extremities.  Neuro: Grossly intact.    LABS    Lab Results   Component Value Date    WBC 9.7 11/21/2021    WBC 8.1 10/26/2014     Lab Results   Component Value Date    RBC 4.47 11/21/2021    RBC 4.66 10/26/2014     Lab Results   Component Value Date    HGB 12.9 11/21/2021    HGB 13.8 10/26/2014     Lab Results   Component Value Date    HCT 40.5 11/21/2021    HCT 41.8 10/26/2014     No components found for: MCT  Lab Results   Component Value Date    MCV 91 11/21/2021    MCV 90 10/26/2014     Lab Results   Component Value Date    MCH 28.9  2021    MCH 29.6 10/26/2014     Lab Results   Component Value Date    MCHC 31.9 2021    MCHC 33.0 10/26/2014     Lab Results   Component Value Date    RDW 13.3 2021    RDW 13.8 10/26/2014     Lab Results   Component Value Date     2021     10/26/2014      Recent Labs   Lab Test 22  0830 21  0508    136   POTASSIUM 3.9 4.4   CHLORIDE 106 105   CO2 26 24   ANIONGAP 8 7   GLC 88 106*   BUN 14 13   CR 0.82 0.75   BERNADETTE 9.2 9.2     Recent Labs   Lab Test 22  0830 21  0617   CHOL 124 216*   HDL 63 49*   LDL 44 138*   TRIG 84 147   NHDL 61 167*        EK21  Sinus  Inferior MI of indeterminate age    ECHO: 21  The visual ejection fraction is estimated at 55%.  distal posterolateral hypokinesis  The right ventricle is normal in structure, function and size.  There is trace to mild tricuspid regurgitation.  The patient exhibited occasional PVCs.  There is no comparison study available.  ______________________________________________________________________________      MRI TEST:  21  1. The LV is normal in cavity size with wall thinning in the apical lateral segment. The global systolic  function is mildly reduced. The LVEF is 49%. There is akinesis of the apical lateral and mid inferolateral  segments.      2. The RV is normal in cavity size. The global systolic function is normal. The RVEF is 64%.      3. Both atria are normal in size.     4. There is no significant valvular disease.      5. Late gadolinium enhancement imaging shows hyperenhancement in the mid infeorlateral and apical lateral  segments. These findings are consistent with a prior myocardial infarction in the left circumflex  territory.      6. There is no pericardial effusion or thickening.     7. There is no intracardiac thrombus.     CONCLUSIONS: Ischemic cardiomyopathy with mildly reduced LV function, LVEF of 49% and evidence of prior  myocardial infarction in the LCx  territory, MINOCA. Normal RV function, RVEF of 64%.     CARDIAC CATH:  11/21/21  Conclusion         Left ventricular filling pressures are normal.    Hemodynamic data has been modified in Epic per physician review.     MINOCA              Plan      Follow bedrest per protocol    Continued medical management and lifestyle modifications for cardiovascular risk factor optimizations.    Arterial sheath removed from radial artery with TR band placement.    Cardiac rehabilitation.    Return to the primary inpatient team for further evaluation and managmenet      Start Imdur and wean nitroglycerin drip.  Cardiac MRI as outpatient.  Discharge tomorrow if pain free on PO nitrates. PRN SL nitroglycerin.       ASSESSMENT AND PLAN:    1. MINOCA; likely coronary vasospasm  -- aspirin, statin lifelong  -- ok to stop ARB if desired as evidence for using it is weak    RTC annually    Raymundo Cope MD

## 2023-06-10 ENCOUNTER — HEALTH MAINTENANCE LETTER (OUTPATIENT)
Age: 63
End: 2023-06-10

## 2024-02-13 ENCOUNTER — MYC REFILL (OUTPATIENT)
Dept: CARDIOLOGY | Facility: CLINIC | Age: 64
End: 2024-02-13
Payer: COMMERCIAL

## 2024-02-13 DIAGNOSIS — I21.4 NSTEMI (NON-ST ELEVATED MYOCARDIAL INFARCTION) (H): ICD-10-CM

## 2024-02-14 ENCOUNTER — DOCUMENTATION ONLY (OUTPATIENT)
Dept: CARDIOLOGY | Facility: CLINIC | Age: 64
End: 2024-02-14
Payer: COMMERCIAL

## 2024-02-14 RX ORDER — ATORVASTATIN CALCIUM 40 MG/1
40 TABLET, FILM COATED ORAL DAILY
Qty: 90 TABLET | Refills: 0 | Status: SHIPPED | OUTPATIENT
Start: 2024-02-14 | End: 2024-03-28

## 2024-02-14 NOTE — TELEPHONE ENCOUNTER
atorvastatin (LIPITOR) 40 MG tablet   Last Written Prescription Date:  1/5/2023  Last Fill Quantity: 90,   # refills: 3  Last Office Visit : 1/5/2023  Future Office visit:  5/23/2024  90 Tabs sent to pharm for Pt care    Berna Banda RN  Central Triage Red Flags/Med Refills

## 2024-03-21 ENCOUNTER — TELEPHONE (OUTPATIENT)
Dept: CARDIOLOGY | Facility: CLINIC | Age: 64
End: 2024-03-21
Payer: COMMERCIAL

## 2024-03-21 NOTE — TELEPHONE ENCOUNTER
Patient Contacted for the patient to call back and schedule the following:    Appointment type: return cardiology  Provider: favio  Return date: 07/11/24  Specialty phone number: [phone] opt 1  Additional appointment(s) needed: n/a   Additonal Notes:n/a

## 2024-03-27 ENCOUNTER — TELEPHONE (OUTPATIENT)
Dept: CARDIOLOGY | Facility: CLINIC | Age: 64
End: 2024-03-27
Payer: COMMERCIAL

## 2024-03-27 NOTE — PROGRESS NOTES
[facility] Cardiology   Cardiology Clinic Note      HPI:   Ms. Terese Gonzalez is a pleasant 63 year old female with medical history pertinent for MINOCA. She presents to cardiology clinic for routine follow up.    Patient was last seen by cardiology with Dr. Cope in January 2023. She originally presented with an NSTEMI in November 2021 and underwent a coronary angiogram which was normal. There was no evidence of obstructive coronary artery disease, in fact her coronary arteries looked angiographically normal. She was initially treated as MINOCA due to coronary vasospasm and was started on DAPT, high intensity statin, and Imdur. The imdur was stopped about 2 years ago and she was instead started on Losartan which she has since stopped.  She also completed cardiac rehabilitation after her event.  Echo from 10/2022 showing resolution of her inferolateral hypokinesis. Left ventricular systolic function is normal with EF 55-60%.    Since her last visit, she reports feeling well, especially since she got a new knee last year. She has been able to be active without limitations. She reports her blood pressures have been running 110s/70s at home.     Today in clinic, she denies chest pain, palpitations, dizziness, syncope, or lower extremity edema.     PAST MEDICAL HISTORY:  Past Medical History:   Diagnosis Date    Arthritis     knee    Heart attack (H)     PONV (postoperative nausea and vomiting)        FAMILY HISTORY:  No family history on file.    SOCIAL HISTORY:  Social History     Socioeconomic History    Marital status:    Tobacco Use    Smoking status: Never    Smokeless tobacco: Never   Substance and Sexual Activity    Alcohol use: Yes     Comment: rarely    Drug use: Never       CURRENT MEDICATIONS:  acetaminophen (TYLENOL) 325 MG tablet, Take 2 tablets (650 mg) by mouth every 4 hours as needed for other (mild pain) May use regular strength (325mg) acetaminophen over the counter medication when Rx runs  Doing well on aspirin 81 mg.  He has no side effects and benefits outweigh the risk   out.  aspirin 81 MG EC tablet, Take 81 mg by mouth daily  cetirizine (ZYRTEC) 10 MG tablet, Take 10 mg by mouth daily  cyanocobalamin (VITAMIN B-12) 1000 MCG tablet, Take 1,000 mcg by mouth daily  DULoxetine (CYMBALTA) 20 MG capsule, Take 20 mg by mouth daily  hydrOXYzine (ATARAX) 25 MG tablet, Take 1 tablet (25 mg) by mouth every 6 hours as needed for itching or anxiety (with pain, moderate pain)  polyethylene glycol (MIRALAX) 17 g packet, Take 17 g by mouth daily  QSYMIA 15-92 MG CP24, Take 1 capsule by mouth daily before breakfast  losartan (COZAAR) 25 MG tablet, Take 0.5 tablets (12.5 mg) by mouth daily (Patient not taking: Reported on 3/28/2024)    No current facility-administered medications on file prior to visit.      ROS:   Refer to HPI    EXAM:  /83 (BP Location: Right arm, Patient Position: Chair, Cuff Size: Adult Regular)   Pulse 83   Wt 90.4 kg (199 lb 6.4 oz)   SpO2 100%   BMI 30.64 kg/m      GENERAL: Appears comfortable, in no acute distress.   HEENT: Eye symmetrical, no discharge or icterus bilaterally.   CV: RRR, +S1S2, no murmur, rub, or gallop.  RESPIRATORY: Respirations regular, even, and unlabored. Lungs CTA throughout.   EXTREMITIES: no peripheral edema.    NEUROLOGIC: Alert and oriented x 3. No focal deficits.   MUSCULOSKELETAL: No joint swelling or tenderness.   SKIN: No jaundice. No rashes or lesions.     Labs, reviewed with patient in clinic today:  CBC RESULTS:  Lab Results   Component Value Date    WBC 9.7 11/21/2021    WBC 8.1 10/26/2014    RBC 4.47 11/21/2021    RBC 4.66 10/26/2014    HGB 11.4 (L) 11/15/2022    HGB 13.8 10/26/2014    HCT 40.5 11/21/2021    HCT 41.8 10/26/2014    MCV 91 11/21/2021    MCV 90 10/26/2014    MCH 28.9 11/21/2021    MCH 29.6 10/26/2014    MCHC 31.9 11/21/2021    MCHC 33.0 10/26/2014    RDW 13.3 11/21/2021    RDW 13.8 10/26/2014     11/21/2021     10/26/2014       CMP RESULTS:  Lab Results   Component Value Date     01/20/2022     " 10/26/2014    POTASSIUM 3.9 01/20/2022    POTASSIUM 4.0 10/26/2014    CHLORIDE 106 01/20/2022    CHLORIDE 108 10/26/2014    CO2 26 01/20/2022    CO2 26 10/26/2014    ANIONGAP 8 01/20/2022    ANIONGAP 6 10/26/2014    GLC 96 11/15/2022    GLC 88 01/20/2022    GLC 88 10/26/2014    BUN 14 01/20/2022    BUN 18 10/26/2014    CR 0.82 01/20/2022    CR 0.88 10/26/2014    GFRESTIMATED 81 01/20/2022    GFRESTIMATED 67 10/26/2014    GFRESTBLACK 81 10/26/2014    BERNADETTE 9.2 01/20/2022    BERNADETTE 9.4 10/26/2014    BILITOTAL 0.7 01/20/2022    ALBUMIN 4.2 01/20/2022    ALKPHOS 90 01/20/2022    ALT 33 01/20/2022    AST 26 01/20/2022        INR RESULTS:  No results found for: \"INR\"    No results found for: \"MAG\"  No results found for: \"NTBNPI\"  No results found for: \"NTBNP\"    LIPIDS:  Lab Results   Component Value Date    CHOL 124 01/20/2022     Lab Results   Component Value Date    HDL 63 01/20/2022     Lab Results   Component Value Date    LDL 44 01/20/2022     Lab Results   Component Value Date    TRIG 84 01/20/2022       Assessment and Plan:   Ms. Gonzalez is a 63 year old female with a PMH of MINOCA    #  MINOCA; likely coronary vasospasm  Patient without recurrence of symptoms and has not required any nitroglycerin since her event.  - aspirin, statin lifelong      Follow up:  1 year with Anatoliy  Chart review time today: 10 minutes  Visit time today: 20 minutes  Total time spent today: 30 minutes    Evelyn Cordoba PA-C  General Cardiology   03/28/24      "

## 2024-03-27 NOTE — TELEPHONE ENCOUNTER
3/27 spoke to pt and scheduled pt from the wait list to a sooner appt w/ LYNNE    1 Principal Discharge DX:	Arm numbness

## 2024-03-28 ENCOUNTER — OFFICE VISIT (OUTPATIENT)
Dept: CARDIOLOGY | Facility: CLINIC | Age: 64
End: 2024-03-28
Payer: COMMERCIAL

## 2024-03-28 VITALS
DIASTOLIC BLOOD PRESSURE: 83 MMHG | OXYGEN SATURATION: 100 % | HEART RATE: 83 BPM | BODY MASS INDEX: 30.64 KG/M2 | WEIGHT: 199.4 LBS | SYSTOLIC BLOOD PRESSURE: 126 MMHG

## 2024-03-28 DIAGNOSIS — I21.4 NSTEMI (NON-ST ELEVATED MYOCARDIAL INFARCTION) (H): ICD-10-CM

## 2024-03-28 PROCEDURE — 99214 OFFICE O/P EST MOD 30 MIN: CPT

## 2024-03-28 PROCEDURE — 99213 OFFICE O/P EST LOW 20 MIN: CPT

## 2024-03-28 RX ORDER — PHENTERMINE AND TOPIRAMATE 15; 92 MG/1; MG/1
1 CAPSULE, EXTENDED RELEASE ORAL
COMMUNITY
Start: 2024-03-18

## 2024-03-28 RX ORDER — ATORVASTATIN CALCIUM 40 MG/1
40 TABLET, FILM COATED ORAL DAILY
Qty: 90 TABLET | Refills: 0 | Status: SHIPPED | OUTPATIENT
Start: 2024-03-28 | End: 2024-08-22

## 2024-03-28 RX ORDER — NITROGLYCERIN 0.4 MG/1
0.4 TABLET SUBLINGUAL PRN
Qty: 30 TABLET | Refills: 3 | Status: SHIPPED | OUTPATIENT
Start: 2024-03-28

## 2024-03-28 ASSESSMENT — PAIN SCALES - GENERAL: PAINLEVEL: NO PAIN (0)

## 2024-03-28 NOTE — LETTER
3/28/2024      RE: Terese Gonzalez  [address]  [facility] [address]       Dear Colleague,    Thank you for the opportunity to participate in the care of your patient, Terese Gonzalez, at the [facility] HEART CLINIC [place] at [facility]. Please see a copy of my visit note below.      [facility] Cardiology   Cardiology Clinic Note      HPI:   Ms. Terese Gonzalez is a pleasant 63 year old female with medical history pertinent for MINOCA. She presents to cardiology clinic for routine follow up.    Patient was last seen by cardiology with Dr. Cope in January 2023. She originally presented with an NSTEMI in November 2021 and underwent a coronary angiogram which was normal. There was no evidence of obstructive coronary artery disease, in fact her coronary arteries looked angiographically normal. She was initially treated as MINOCA due to coronary vasospasm and was started on DAPT, high intensity statin, and Imdur. The imdur was stopped about 2 years ago and she was instead started on Losartan which she has since stopped.  She also completed cardiac rehabilitation after her event.  Echo from 10/2022 showing resolution of her inferolateral hypokinesis. Left ventricular systolic function is normal with EF 55-60%.    Since her last visit, she reports feeling well, especially since she got a new knee last year. She has been able to be active without limitations. She reports her blood pressures have been running 110s/70s at home.     Today in clinic, she denies chest pain, palpitations, dizziness, syncope, or lower extremity edema.     PAST MEDICAL HISTORY:  Past Medical History:   Diagnosis Date    Arthritis     knee    Heart attack (H)     PONV (postoperative nausea and vomiting)        FAMILY HISTORY:  No family history on file.    SOCIAL HISTORY:  Social History     Socioeconomic History    Marital status:    Tobacco Use    Smoking status: Never     Smokeless tobacco: Never   Substance and Sexual Activity    Alcohol use: Yes     Comment: rarely    Drug use: Never       CURRENT MEDICATIONS:  acetaminophen (TYLENOL) 325 MG tablet, Take 2 tablets (650 mg) by mouth every 4 hours as needed for other (mild pain) May use regular strength (325mg) acetaminophen over the counter medication when Rx runs out.  aspirin 81 MG EC tablet, Take 81 mg by mouth daily  cetirizine (ZYRTEC) 10 MG tablet, Take 10 mg by mouth daily  cyanocobalamin (VITAMIN B-12) 1000 MCG tablet, Take 1,000 mcg by mouth daily  DULoxetine (CYMBALTA) 20 MG capsule, Take 20 mg by mouth daily  hydrOXYzine (ATARAX) 25 MG tablet, Take 1 tablet (25 mg) by mouth every 6 hours as needed for itching or anxiety (with pain, moderate pain)  polyethylene glycol (MIRALAX) 17 g packet, Take 17 g by mouth daily  QSYMIA 15-92 MG CP24, Take 1 capsule by mouth daily before breakfast  losartan (COZAAR) 25 MG tablet, Take 0.5 tablets (12.5 mg) by mouth daily (Patient not taking: Reported on 3/28/2024)    No current facility-administered medications on file prior to visit.      ROS:   Refer to HPI    EXAM:  /83 (BP Location: Right arm, Patient Position: Chair, Cuff Size: Adult Regular)   Pulse 83   Wt 90.4 kg (199 lb 6.4 oz)   SpO2 100%   BMI 30.64 kg/m      GENERAL: Appears comfortable, in no acute distress.   HEENT: Eye symmetrical, no discharge or icterus bilaterally.   CV: RRR, +S1S2, no murmur, rub, or gallop.  RESPIRATORY: Respirations regular, even, and unlabored. Lungs CTA throughout.   EXTREMITIES: no peripheral edema.    NEUROLOGIC: Alert and oriented x 3. No focal deficits.   MUSCULOSKELETAL: No joint swelling or tenderness.   SKIN: No jaundice. No rashes or lesions.     Labs, reviewed with patient in clinic today:  CBC RESULTS:  Lab Results   Component Value Date    WBC 9.7 11/21/2021    WBC 8.1 10/26/2014    RBC 4.47 11/21/2021    RBC 4.66 10/26/2014    HGB 11.4 (L) 11/15/2022    HGB 13.8 10/26/2014     "HCT 40.5 11/21/2021    HCT 41.8 10/26/2014    MCV 91 11/21/2021    MCV 90 10/26/2014    MCH 28.9 11/21/2021    MCH 29.6 10/26/2014    MCHC 31.9 11/21/2021    MCHC 33.0 10/26/2014    RDW 13.3 11/21/2021    RDW 13.8 10/26/2014     11/21/2021     10/26/2014       CMP RESULTS:  Lab Results   Component Value Date     01/20/2022     10/26/2014    POTASSIUM 3.9 01/20/2022    POTASSIUM 4.0 10/26/2014    CHLORIDE 106 01/20/2022    CHLORIDE 108 10/26/2014    CO2 26 01/20/2022    CO2 26 10/26/2014    ANIONGAP 8 01/20/2022    ANIONGAP 6 10/26/2014    GLC 96 11/15/2022    GLC 88 01/20/2022    GLC 88 10/26/2014    BUN 14 01/20/2022    BUN 18 10/26/2014    CR 0.82 01/20/2022    CR 0.88 10/26/2014    GFRESTIMATED 81 01/20/2022    GFRESTIMATED 67 10/26/2014    GFRESTBLACK 81 10/26/2014    BERNADETTE 9.2 01/20/2022    BERNADETTE 9.4 10/26/2014    BILITOTAL 0.7 01/20/2022    ALBUMIN 4.2 01/20/2022    ALKPHOS 90 01/20/2022    ALT 33 01/20/2022    AST 26 01/20/2022        INR RESULTS:  No results found for: \"INR\"    No results found for: \"MAG\"  No results found for: \"NTBNPI\"  No results found for: \"NTBNP\"    LIPIDS:  Lab Results   Component Value Date    CHOL 124 01/20/2022     Lab Results   Component Value Date    HDL 63 01/20/2022     Lab Results   Component Value Date    LDL 44 01/20/2022     Lab Results   Component Value Date    TRIG 84 01/20/2022       Assessment and Plan:   Ms. Gonzalez is a 63 year old female with a PMH of MINOCA    #  MINOCA; likely coronary vasospasm  Patient without recurrence of symptoms and has not required any nitroglycerin since her event.  - aspirin, statin lifelong      Follow up:  1 year with Anatoliy  Chart review time today: 10 minutes  Visit time today: 20 minutes  Total time spent today: 30 minutes    Evelyn Cordoba PA-C  General Cardiology   03/28/24    "

## 2024-03-28 NOTE — NURSING NOTE
Chief Complaint   Patient presents with    Follow Up     Return Cardiology     Vitals were taken and medications reconciled.    Yann Chery, EMT  2:14 PM

## 2024-03-28 NOTE — PATIENT INSTRUCTIONS
You were seen today in the Cardiovascular Clinic at the [facility] by:       Evelyn Cordoba PA-C    Your visit summary and instructions are as follows:    Refills made    Continue to work toward the recommendation of 150 minutes of moderate intensity exercise/week and maintain a healthy lifestyle (avoid illicit drugs, smoking and moderate alcohol consumption)      Return to cardiology clinic in 1 year      Thank you for your visit today!     Please MyChart message me or call my nurse Jennifer if you have any questions or concerns.      During Business Hours:  [phone], option # 1 (University) then option # 4 (medical questions) and ask to speak with my nurse.     After hours, weekends or holidays:   [phone], Option #4  Ask to speak to the On-Call Cardiologist. Inform them you are a cardiology patient at the [facility].

## 2024-08-22 ENCOUNTER — MYC REFILL (OUTPATIENT)
Dept: CARDIOLOGY | Facility: CLINIC | Age: 64
End: 2024-08-22
Payer: COMMERCIAL

## 2024-08-22 DIAGNOSIS — I21.4 NSTEMI (NON-ST ELEVATED MYOCARDIAL INFARCTION) (H): ICD-10-CM

## 2024-08-23 NOTE — TELEPHONE ENCOUNTER
[facility] Call Center    Phone Message    May a detailed message be left on voicemail: yes     Reason for Call: Medication Refill Request    Has the patient contacted the pharmacy for the refill? Yes   Name of medication being requested: atorvastatin (LIPITOR) 40 MG tablet   Provider who prescribed the medication: Adalid  Pharmacy:    [facility]/PHARMACY #[id number] - [facility] [address]    Date medication is needed: 8/23/24   Patient has four days left, but is in need of another refill. Patient called stating the pharmacy wouldn't refill it. Please call patient back with any questions.    Action Taken: Other: Cardiology    Travel Screening: Not Applicable     Thank you!  [facility]

## 2024-08-23 NOTE — TELEPHONE ENCOUNTER
atorvastatin (LIPITOR) 40 MG tablet       Last Written Prescription Date:  3-28-24  Last Fill Quantity: 90,   # refills: 0  Last Office Visit : 3-28-24  Future Office visit:  none    Last LDL: 1-20-22    Routing refill request to provider for review/approval because:  Overdue LDL    Pt comment: I am out of this currently. I have not made an appt with Dr. Cope.  I would like to have more than a 90 day supply as I know I will not be able to get in to see anyone for quite some time and I feel great. Please advise. I only have 4 tablets left.

## 2024-08-26 RX ORDER — ATORVASTATIN CALCIUM 40 MG/1
40 TABLET, FILM COATED ORAL DAILY
Qty: 90 TABLET | Refills: 2 | Status: SHIPPED | OUTPATIENT
Start: 2024-08-26

## 2024-10-12 ENCOUNTER — HEALTH MAINTENANCE LETTER (OUTPATIENT)
Age: 64
End: 2024-10-12

## 2024-12-02 ENCOUNTER — TELEPHONE (OUTPATIENT)
Dept: CARDIOLOGY | Facility: CLINIC | Age: 64
End: 2024-12-02
Payer: COMMERCIAL

## 2024-12-02 NOTE — TELEPHONE ENCOUNTER
Sent Mychart (1st Attempt) and Left Voicemail (2nd Attempt) for the patient to call back and schedule the following:    Appointment type:  rtn cardio   Provider: Adalid   Return date: 03/26/25  Specialty phone number: [phone] opt 1   Additional appointment(s) needed: n/a   Additonal Notes: n/a

## 2025-05-24 ENCOUNTER — HEALTH MAINTENANCE LETTER (OUTPATIENT)
Age: 65
End: 2025-05-24

## (undated) DEVICE — LINEN ORTHO ACL PACK 5447

## (undated) DEVICE — Device

## (undated) DEVICE — SET HANDPIECE INTERPULSE W/COAXIAL FAN SPRAY TIP 0210118000

## (undated) DEVICE — PACK TOTAL KNEE BOXED LATEX FREE PO15TKFCT

## (undated) DEVICE — BONE CEMENT MIXEVAC III HI VAC KIT  0206-015-000

## (undated) DEVICE — BAG CLEAR TRASH 1.3M 39X33" P4040C

## (undated) DEVICE — CAST PADDING 6" STERILE 9046S

## (undated) DEVICE — LINEN FULL SHEET 5511

## (undated) DEVICE — INTRO GLIDESHEATH SLENDER 6FR 10X45CM 60-1060

## (undated) DEVICE — GLOVE BIOGEL PI MICRO INDICATOR UNDERGLOVE SZ 8.5 48985

## (undated) DEVICE — DRSG AQUACEL AG HYDROFIBER  3.5X10" 422605

## (undated) DEVICE — GLOVE PROTEXIS W/NEU-THERA 7.0  2D73TE70

## (undated) DEVICE — BLADE SAW SAGITTAL STRK 25X90X1.27MM HD SYS 6 6125-127-090

## (undated) DEVICE — PACK HEART LEFT CUSTOM

## (undated) DEVICE — GLOVE BIOGEL PI MICRO SZ 8.5 48585

## (undated) DEVICE — GLOVE BIOGEL PI SZ 8.5 40885

## (undated) DEVICE — GLOVE BIOGEL PI SZ 7.0 40870

## (undated) DEVICE — TUBING PRESSURE 30"

## (undated) DEVICE — MANIFOLD KIT ANGIO AUTOMATED 014613

## (undated) DEVICE — TOURNIQUET SGL  BLADDER 30"X4" BLUE 5921030135

## (undated) DEVICE — SOL NACL 0.9% IRRIG 3000ML BAG 2B7477

## (undated) DEVICE — BLADE SAW SAGITTAL STRK 13X90X1.27MM HD SYS 6 6113-127-090

## (undated) DEVICE — GLOVE BIOGEL PI MICRO INDICATOR UNDERGLOVE SZ 7.0 48970

## (undated) DEVICE — SU VICRYL 2-0 CT-1 27" UND J259H

## (undated) DEVICE — KIT HAND CONTROL ACIST 014644 AR-P54

## (undated) DEVICE — SU ETHIBOND 0 CT-1 CR 8X18" CX21D

## (undated) DEVICE — SLEEVE TR BAND RADIAL COMPRESSION DEVICE 24CM TRB24-REG

## (undated) DEVICE — SUCTION MANIFOLD NEPTUNE 2 SYS 4 PORT 0702-020-000

## (undated) DEVICE — PREP CHLORAPREP 26ML TINTED HI-LITE ORANGE 930815

## (undated) DEVICE — SU VICRYL 1 CT-1 27" J341H

## (undated) RX ORDER — ONDANSETRON 2 MG/ML
INJECTION INTRAMUSCULAR; INTRAVENOUS
Status: DISPENSED
Start: 2022-11-14

## (undated) RX ORDER — VANCOMYCIN HYDROCHLORIDE 1 G/20ML
INJECTION, POWDER, LYOPHILIZED, FOR SOLUTION INTRAVENOUS
Status: DISPENSED
Start: 2022-11-14

## (undated) RX ORDER — OXYCODONE HYDROCHLORIDE 5 MG/1
TABLET ORAL
Status: DISPENSED
Start: 2022-11-14

## (undated) RX ORDER — PROPOFOL 10 MG/ML
INJECTION, EMULSION INTRAVENOUS
Status: DISPENSED
Start: 2022-11-14

## (undated) RX ORDER — TRANEXAMIC ACID 10 MG/ML
INJECTION, SOLUTION INTRAVENOUS
Status: DISPENSED
Start: 2022-11-14

## (undated) RX ORDER — TRANEXAMIC ACID 650 MG/1
TABLET ORAL
Status: DISPENSED
Start: 2022-11-14

## (undated) RX ORDER — LIDOCAINE HYDROCHLORIDE 10 MG/ML
INJECTION, SOLUTION EPIDURAL; INFILTRATION; INTRACAUDAL; PERINEURAL
Status: DISPENSED
Start: 2022-11-14

## (undated) RX ORDER — ACETAMINOPHEN 325 MG/1
TABLET ORAL
Status: DISPENSED
Start: 2022-11-14

## (undated) RX ORDER — HEPARIN SODIUM 1000 [USP'U]/ML
INJECTION, SOLUTION INTRAVENOUS; SUBCUTANEOUS
Status: DISPENSED
Start: 2021-11-21

## (undated) RX ORDER — FENTANYL CITRATE-0.9 % NACL/PF 10 MCG/ML
PLASTIC BAG, INJECTION (ML) INTRAVENOUS
Status: DISPENSED
Start: 2022-11-14

## (undated) RX ORDER — FENTANYL CITRATE 50 UG/ML
INJECTION, SOLUTION INTRAMUSCULAR; INTRAVENOUS
Status: DISPENSED
Start: 2021-11-21

## (undated) RX ORDER — NICARDIPINE HCL-0.9% SOD CHLOR 1 MG/10 ML
SYRINGE (ML) INTRAVENOUS
Status: DISPENSED
Start: 2021-11-21

## (undated) RX ORDER — HYDROXYZINE HYDROCHLORIDE 25 MG/1
TABLET, FILM COATED ORAL
Status: DISPENSED
Start: 2022-11-14

## (undated) RX ORDER — NITROGLYCERIN 5 MG/ML
VIAL (ML) INTRAVENOUS
Status: DISPENSED
Start: 2021-11-21

## (undated) RX ORDER — LIDOCAINE HYDROCHLORIDE 10 MG/ML
INJECTION, SOLUTION EPIDURAL; INFILTRATION; INTRACAUDAL; PERINEURAL
Status: DISPENSED
Start: 2021-11-21

## (undated) RX ORDER — DEXAMETHASONE SODIUM PHOSPHATE 4 MG/ML
INJECTION, SOLUTION INTRA-ARTICULAR; INTRALESIONAL; INTRAMUSCULAR; INTRAVENOUS; SOFT TISSUE
Status: DISPENSED
Start: 2022-11-14

## (undated) RX ORDER — HYDROXYZINE HYDROCHLORIDE 10 MG/1
TABLET, FILM COATED ORAL
Status: DISPENSED
Start: 2022-11-14

## (undated) RX ORDER — FENTANYL CITRATE 50 UG/ML
INJECTION, SOLUTION INTRAMUSCULAR; INTRAVENOUS
Status: DISPENSED
Start: 2022-11-14

## (undated) RX ORDER — CEFAZOLIN SODIUM/WATER 2 G/20 ML
SYRINGE (ML) INTRAVENOUS
Status: DISPENSED
Start: 2022-11-14